# Patient Record
Sex: MALE | Race: BLACK OR AFRICAN AMERICAN | Employment: OTHER | URBAN - METROPOLITAN AREA
[De-identification: names, ages, dates, MRNs, and addresses within clinical notes are randomized per-mention and may not be internally consistent; named-entity substitution may affect disease eponyms.]

---

## 2017-06-02 ENCOUNTER — OFFICE VISIT (OUTPATIENT)
Dept: PAIN MANAGEMENT | Age: 37
End: 2017-06-02

## 2017-06-02 DIAGNOSIS — Z71.89 COUNSELING AND COORDINATION OF CARE: Primary | ICD-10-CM

## 2017-06-07 ENCOUNTER — OFFICE VISIT (OUTPATIENT)
Dept: PAIN MANAGEMENT | Age: 37
End: 2017-06-07

## 2017-06-07 VITALS
WEIGHT: 171 LBS | BODY MASS INDEX: 23.94 KG/M2 | HEART RATE: 52 BPM | HEIGHT: 71 IN | RESPIRATION RATE: 19 BRPM | SYSTOLIC BLOOD PRESSURE: 153 MMHG | DIASTOLIC BLOOD PRESSURE: 96 MMHG

## 2017-06-07 DIAGNOSIS — Z79.899 ENCOUNTER FOR LONG-TERM (CURRENT) USE OF MEDICATIONS: ICD-10-CM

## 2017-06-07 DIAGNOSIS — Z86.59 HISTORY OF DEPRESSION: ICD-10-CM

## 2017-06-07 DIAGNOSIS — M54.2 NECK PAIN: ICD-10-CM

## 2017-06-07 DIAGNOSIS — M47.816 DEGENERATIVE JOINT DISEASE OF CERVICAL AND LUMBAR SPINE: ICD-10-CM

## 2017-06-07 DIAGNOSIS — F43.10 PTSD (POST-TRAUMATIC STRESS DISORDER): ICD-10-CM

## 2017-06-07 DIAGNOSIS — M50.30 DDD (DEGENERATIVE DISC DISEASE), CERVICAL: ICD-10-CM

## 2017-06-07 DIAGNOSIS — M54.41 CHRONIC MIDLINE LOW BACK PAIN WITH RIGHT-SIDED SCIATICA: Primary | ICD-10-CM

## 2017-06-07 DIAGNOSIS — M47.812 DEGENERATIVE JOINT DISEASE OF CERVICAL AND LUMBAR SPINE: ICD-10-CM

## 2017-06-07 DIAGNOSIS — G89.29 CHRONIC MIDLINE LOW BACK PAIN WITH RIGHT-SIDED SCIATICA: Primary | ICD-10-CM

## 2017-06-07 DIAGNOSIS — G89.4 CHRONIC PAIN SYNDROME: ICD-10-CM

## 2017-06-07 DIAGNOSIS — M51.36 DEGENERATIVE DISC DISEASE, LUMBAR: ICD-10-CM

## 2017-06-07 LAB
ALCOHOL UR POC: NORMAL
AMPHETAMINES UR POC: NEGATIVE
BARBITURATES UR POC: NEGATIVE
BENZODIAZEPINES UR POC: NEGATIVE
BUPRENORPHINE UR POC: NORMAL
CANNABINOIDS UR POC: NEGATIVE
CARISOPRODOL UR POC: NORMAL
COCAINE UR POC: NEGATIVE
FENTANYL UR POC: NORMAL
MDMA/ECSTASY UR POC: NEGATIVE
METHADONE UR POC: NEGATIVE
METHAMPHETAMINE UR POC: NEGATIVE
METHYLPHENIDATE UR POC: NEGATIVE
OPIATES UR POC: NEGATIVE
OXYCODONE UR POC: NEGATIVE
PHENCYCLIDINE UR POC: NEGATIVE
PROPOXYPHENE UR POC: NORMAL
TRAMADOL UR POC: NORMAL
TRICYCLICS UR POC: NEGATIVE

## 2017-06-07 RX ORDER — FLUOXETINE 20 MG/1
20 TABLET ORAL DAILY
COMMUNITY

## 2017-06-07 RX ORDER — TRAMADOL HYDROCHLORIDE 50 MG/1
50 TABLET ORAL
COMMUNITY

## 2017-06-07 RX ORDER — CETIRIZINE HCL 10 MG
TABLET ORAL
COMMUNITY

## 2017-06-07 RX ORDER — DIAZEPAM 2 MG/1
2 TABLET ORAL
Qty: 90 TAB | Refills: 0 | Status: SHIPPED | OUTPATIENT
Start: 2017-06-07 | End: 2017-07-10 | Stop reason: SDUPTHER

## 2017-06-07 RX ORDER — ZOLPIDEM TARTRATE 10 MG/1
12.5 TABLET ORAL
COMMUNITY

## 2017-06-07 RX ORDER — GABAPENTIN 400 MG/1
400 CAPSULE ORAL 3 TIMES DAILY
Status: ON HOLD | COMMUNITY
End: 2017-11-01

## 2017-06-07 NOTE — MR AVS SNAPSHOT
Visit Information Date & Time Provider Department Dept. Phone Encounter #  
 6/7/2017 12:30 PM Breanna Turcios MD [facility]  Follow-up Instructions Return in about 5 weeks (around 7/12/2017). Upcoming Health Maintenance Date Due DTaP/Tdap/Td series (1 - Tdap) 9/28/2003 INFLUENZA AGE 9 TO ADULT 8/1/2017 Allergies as of 6/7/2017  Review Complete On: 6/7/2017 By: Breanna Turcios MD  
 No Known Allergies Current Immunizations  Never Reviewed No immunizations on file. Not reviewed this visit You Were Diagnosed With   
  
 Codes Comments Encounter for long-term (current) use of medications    -  Primary ICD-10-CM: C53.227 ICD-9-CM: V58.69 Vitals BP Pulse Resp Height(growth percentile) Weight(growth percentile) BMI  
 (!) 153/96 (!) 52 19 5' 11\" (1.803 m) 171 lb (77.6 kg) 23.85 kg/m2 Smoking Status Never Smoker Vitals History BMI and BSA Data Body Mass Index Body Surface Area  
 23.85 kg/m 2 1.97 m 2 Your Updated Medication List  
  
   
This list is accurate as of: 6/7/17  1:30 PM.  Always use your most recent med list.  
  
  
  
  
 cetirizine 10 mg tablet Commonly known as:  ZYRTEC Take  by mouth. FLUoxetine 20 mg tablet Commonly known as:  PROzac Take 20 mg by mouth daily. gabapentin 400 mg capsule Commonly known as:  NEURONTIN Take 400 mg by mouth three (3) times daily. traMADol 50 mg tablet Commonly known as:  ULTRAM  
Take 50 mg by mouth every eight (8) hours as needed for Pain.  
  
 zolpidem 10 mg tablet Commonly known as:  AMBIEN Take  by mouth nightly as needed for Sleep. We Performed the Following AMB POC DRUG SCREEN () [ [facility]] DRUG SCREEN [[id number] Custom] Follow-up Instructions Return in about 5 weeks (around 7/12/2017). Introducing [facility]! Gurmeet Keep introduces [employer] patient portal. Now you can access parts of your medical record, email your doctor's office, and request medication refills online. 1. In your internet browser, go to [email or web address] 2. Click on the First Time User? Click Here link in the Sign In box. You will see the New Member Sign Up page. 3. Enter your [employer] Access Code exactly as it appears below. You will not need to use this code after youve completed the sign-up process. If you do not sign up before the expiration date, you must request a new code. · [employer] Access Code: Oleg Barrier Expires: 9/5/2017 12:02 PM 
 
4. Enter the last four digits of your Social Security Number (xxxx) and Date of Birth (mm/dd/yyyy) as indicated and click Submit. You will be taken to the next sign-up page. 5. Create a [employer] ID. This will be your [employer] login ID and cannot be changed, so think of one that is secure and easy to remember. 6. Create a [employer] password. You can change your password at any time. 7. Enter your Password Reset Question and Answer. This can be used at a later time if you forget your password. 8. Enter your e-mail address. You will receive e-mail notification when new information is available in [address] 9. Click Sign Up. You can now view and download portions of your medical record. 10. Click the Download Summary menu link to download a portable copy of your medical information. If you have questions, please visit the Frequently Asked Questions section of the [employer] website. Remember, [employer] is NOT to be used for urgent needs. For medical emergencies, dial 911. Now available from your iPhone and Android! Please provide this summary of care documentation to your next provider. If you have any questions after today's visit, please call [phone].

## 2017-06-07 NOTE — PROGRESS NOTES
HISTORY OF PRESENT ILLNESS  Marcel Martinez is a 31 y.o. male. HPI Comments: New patient  Referred for pain management by medical [facility]  Visit survey has been reviewed  On the pain diagram the patient indicates low back pain and neck pain with symptoms in arms and legs  The least amount of pain 8 out of 10  Greatest pain 10 out of 10  Average pain 8 out of 10  I reviewed medications listed on the visit survey which includes gabapentin 400 mg 3 times a day, Prozac 20 mg, tramadol 50 mg, Ambien  I have also reviewed medications in the medical records. I attempted to review medications through prescription monitoring program but patient in medical care is  based. The patient has tried oral nonsteroidal anti-inflammatory drugs and may use these as directed by primary care physician. I have reviewed progress notes sent to our clinic from the medical clinic/[facility] [place]. There have been prescriptions for tizanidine 4 mg, ibuprofen 800 mg, there have been prescriptions for Valium  -He is active [employer]. He is working with physical therapy  He was mentioning feelings of numbness to the physical therapist and he has been scheduled to see a neurologist, in several weeks. He has not seen other specialist about his neck and back pain until today. Chief complaint neck pain and low back pain    Neck pain, midline, deep, achy and sharp. Certain neck movements increase neck pain and arm pain. Intermittent symptoms into both arms, all the way to his hands/fingers. Neck pain is present every day also present during the night. Almost constant. Neck pain started December 2016. He feels that neck and low back pain started after a 12 hour work shift. Low back pain, also started in December, 2016. Low back pain is slightly to the right. Present during day and night. Low back pain increases with extended sitting, standing, walking.   Intermittent symptoms into the right thigh, sometimes past the knee. Low back pain, deep, achy, sharp. He has tried physical therapy and massage. He has not had electromyography testing performed. No history of spine injections or surgery. His Prozac is to help with depression and posttraumatic stress disorder. He is seeing the mental health clinic  He was educated that there might be an interaction with tramadol and Prozac. He did try tramadol, holding on use of Prozac temporarily. He does not feel tramadol or gabapentin have been helpful. He reports no benefit with Flexeril, Motrin, Zanaflex. He reports that Valium helped with muscle cramps. He is no longer using the gabapentin, Zanaflex or tramadol. He is still using Prozac and Ambien. -CT of cervical spine without contrast, 2/11/17, radiologist impression: Mild degenerative disease C6-7, otherwise unremarkable    MRI cervical spine, May 2017, radiologist impression multilevel mild degenerative disc and joint changes. MRI of the lumbar spine, May 2017, radiologist impression: Mild facet degenerative changes L4-S1 and mild degenerative disc disease L3-4. X-ray of lumbar spine-no acute bony abnormality    The patient  should keep track of total Tylenol intake and make sure liver function tests have been checked with primary care physician.    -opioid risk tool has been reviewed, and will be scanned. Total score--2,   history depression, history posttraumatic stress disorder    -The available medical record/information has been reviewed including notes from the referring physician's office.     -New patient survey reviewed and will be scanned. Please see this form for more information concerning PMH,FH,SH, and ROS. The majority of today's visit was spent counseling and coordinating care. Total visit time was greater than 60 minutes. - Preliminary urine screen reviewed. - Additional time was spent reviewing medical records,interpreting data and educating the patient. -Discussing Dxes,condition and treatment options,possible side effects/risks/complications. Review of Systems   Constitutional: Positive for chills, malaise/fatigue and weight loss. HENT: Negative for hearing loss. Eyes: Negative for pain and discharge. Respiratory: Negative for cough and shortness of breath. Cardiovascular: Negative for claudication and leg swelling. Gastrointestinal: Positive for diarrhea and heartburn. Genitourinary: Negative for dysuria and urgency. Musculoskeletal: Positive for back pain and joint pain. Skin: Negative for itching and rash. Neurological: Positive for headaches. Negative for dizziness and seizures. Endo/Heme/Allergies: Positive for environmental allergies. Does not bruise/bleed easily. Psychiatric/Behavioral: Negative for substance abuse and suicidal ideas. The patient has insomnia. Physical Exam   Constitutional: He appears well-developed and well-nourished. He is cooperative. He does not have a sickly appearance. HENT:   Head: Normocephalic and atraumatic. Right Ear: External ear normal. No drainage. Left Ear: External ear normal. No drainage. Nose: Nose normal.   Mouth/Throat: No oropharyngeal exudate. Eyes: Lids are normal. Right eye exhibits no discharge. Left eye exhibits no discharge. Right conjunctiva has no hemorrhage. Left conjunctiva has no hemorrhage. Pupils are equal.   Neck: Neck supple. No tracheal deviation present. No thyroid mass present. Cardiovascular: Normal rate and regular rhythm. No murmur heard. Pulmonary/Chest: Effort normal and breath sounds normal. No respiratory distress. Musculoskeletal:        Cervical back: He exhibits decreased range of motion, tenderness and spasm. Lumbar back: He exhibits decreased range of motion, tenderness and spasm. Neurological: He is alert. He has normal reflexes. No cranial nerve deficit.    Antalgic gait  Grossly normal strength, deep tendon reflexes, light touch sensation upper extremities  Tenderness to the right hip and tenderness to both sacroiliac joints  Grossly normal strength lower extremities except possible weakness right hip and right knee movements, lack of full effort secondary to complaints of pain  Decreased deep tendon reflexes lower extremities  Negative seated straight leg raise test bilaterally  Grossly normal light touch sensation lower extremities   Skin: Skin is intact. No abrasion and no rash noted. He is not diaphoretic. No cyanosis. Psychiatric: His speech is normal and behavior is normal. Thought content normal. His mood appears not anxious. His affect is not angry. Cognition and memory are normal. He does not express inappropriate judgment. He does not exhibit a depressed mood. Nursing note and vitals reviewed. ASSESSMENT and PLAN  Encounter Diagnoses   Name Primary?  Chronic midline low back pain with right-sided sciatica Yes    Chronic pain syndrome     Encounter for long-term (current) use of medications     PTSD (post-traumatic stress disorder)     Neck pain     History of depression     DDD (degenerative disc disease), cervical     Degenerative disc disease, lumbar     Degenerative joint disease of cervical and lumbar spine     follow-up, about 5 weeks  The patient has indicated that all the pain medicine he has tried and other medications have not been helpful except for Valium. I will prescribe Valium 2 mg 3 times a day as needed to help with muscle spasm, tightness and pain. He may use oral nonsteroidal anti-inflammatory drugs as directed by primary care physician  My primary recommendation today is that he work with a chiropractor. I gave him the name of several clinics I would recommend. I believe he should continue close follow-up with his mental health clinic this should include assistance with pain management from their standpoint.   Consider Cymbalta but he is already on Prozac, I would want to know that his psychiatrist approves of Cymbalta if this were to be started. I do believe he should remain physically active including gentle exercise in the pool. He would also benefit from yoga, massage, alternative treatments. Overall the radiology reports of his neck and his low back are not overly impressive. Some mild degenerative changes. At this time I am not recommending spine injections but if there is no improvement in the future this could certainly be considered. -He will be seeing a neurologist in about 2 weeks. I have asked for him to discuss the possibility of electromyography testing with the neurologist.  I will certainly be interested in the impression from the neurologist.  At this time my initial impression is there might be some symptom magnification. He seemed to pull away quickly even with light palpation to sacroiliac joints in his right hip. He was quite hesitant when testing strength of his legs especially the right leg. There seemed to be the possibility of exaggeration of his antalgic gait. If there is an underlying diagnosis made by the neurologist to help explain my impression that I certainly am willing to change my opinion. It should also be noted that there was a progress note from his medical clinic, February of this year that included the following statement- \"today, he is reported to have limped in to exam room reporting this as associated with his neck pain. He was observed to ambulate without problem during my exam.  He also asked for refill of Valium as the only thing that helps his neck and upper back discomfort. \"  I believe this statement indicates that they were also feeling there might be some symptom magnification.  -At this time, I am not recommending use of opioid pain medicine to treat his pain complaints.       Pain is a complex sensory and emotional experience intimately related to the concept of suffering and the life experiences and psychological makeup of the individual. There is a host of psychosocial issues,including depression,anxiety,fear,altered social roles,and loss of activities which have a strong scientific basis for contributing to the chronic pain state. The effective treatment of chronic pain involves understanding the individual with pain. Issues of fear avoidance,catastrophizing,belief systems about exercise and injury,sleep disturbance,family dynamics,and other factors may play a role in the patient's experience of pain.

## 2017-07-10 ENCOUNTER — OFFICE VISIT (OUTPATIENT)
Dept: PAIN MANAGEMENT | Age: 37
End: 2017-07-10

## 2017-07-10 VITALS
WEIGHT: 179 LBS | HEIGHT: 71 IN | HEART RATE: 63 BPM | BODY MASS INDEX: 25.06 KG/M2 | RESPIRATION RATE: 16 BRPM | DIASTOLIC BLOOD PRESSURE: 87 MMHG | SYSTOLIC BLOOD PRESSURE: 146 MMHG

## 2017-07-10 DIAGNOSIS — M54.2 NECK PAIN: ICD-10-CM

## 2017-07-10 DIAGNOSIS — M47.816 DEGENERATIVE JOINT DISEASE OF CERVICAL AND LUMBAR SPINE: ICD-10-CM

## 2017-07-10 DIAGNOSIS — G89.29 CHRONIC MIDLINE LOW BACK PAIN WITH RIGHT-SIDED SCIATICA: Primary | ICD-10-CM

## 2017-07-10 DIAGNOSIS — F43.10 PTSD (POST-TRAUMATIC STRESS DISORDER): ICD-10-CM

## 2017-07-10 DIAGNOSIS — M54.41 CHRONIC MIDLINE LOW BACK PAIN WITH RIGHT-SIDED SCIATICA: Primary | ICD-10-CM

## 2017-07-10 DIAGNOSIS — M50.30 DDD (DEGENERATIVE DISC DISEASE), CERVICAL: ICD-10-CM

## 2017-07-10 DIAGNOSIS — M51.36 DEGENERATIVE DISC DISEASE, LUMBAR: ICD-10-CM

## 2017-07-10 DIAGNOSIS — Z86.59 HISTORY OF DEPRESSION: ICD-10-CM

## 2017-07-10 DIAGNOSIS — G89.4 CHRONIC PAIN SYNDROME: ICD-10-CM

## 2017-07-10 DIAGNOSIS — M47.812 DEGENERATIVE JOINT DISEASE OF CERVICAL AND LUMBAR SPINE: ICD-10-CM

## 2017-07-10 RX ORDER — DIAZEPAM 2 MG/1
2 TABLET ORAL
Qty: 90 TAB | Refills: 1 | Status: SHIPPED | OUTPATIENT
Start: 2017-07-10 | End: 2017-09-06 | Stop reason: SDUPTHER

## 2017-07-10 RX ORDER — CELECOXIB 200 MG/1
CAPSULE ORAL 2 TIMES DAILY
COMMUNITY
End: 2018-08-20 | Stop reason: ALTCHOICE

## 2017-07-10 RX ORDER — LOSARTAN POTASSIUM AND HYDROCHLOROTHIAZIDE 12.5; 5 MG/1; MG/1
1 TABLET ORAL DAILY
COMMUNITY

## 2017-07-10 NOTE — MR AVS SNAPSHOT
Visit Information Date & Time Provider Department Dept. Phone Encounter #  
 7/10/2017  2:30 PM Shantal Dubon MD [address] [place] [address] Pain Management [phone] Follow-up Instructions Return in about 2 months (around 9/10/2017). Your Appointments 7/10/2017  2:30 PM  
Follow Up with Shantal Dubon MD  
[address] for Pain Management [facility]) Appt Note: return in 7 weeks [address] [place] [address]  
[phone] [facility] [id number] Upcoming Health Maintenance Date Due DTaP/Tdap/Td series (1 - Tdap) 9/28/2003 INFLUENZA AGE 9 TO ADULT 8/1/2017 Allergies as of 7/10/2017  Review Complete On: 7/10/2017 By: Shantal Dubon MD  
 No Known Allergies Current Immunizations  Never Reviewed No immunizations on file. Not reviewed this visit You Were Diagnosed With   
  
 Codes Comments Chronic midline low back pain with right-sided sciatica    -  Primary ICD-10-CM: M54.41, G89.29 ICD-9-CM: 724.2, 724.3, 338.29 Chronic pain syndrome     ICD-10-CM: G89.4 ICD-9-CM: 338.4 PTSD (post-traumatic stress disorder)     ICD-10-CM: F43.10 ICD-9-CM: 309.81 Neck pain     ICD-10-CM: M54.2 ICD-9-CM: 723.1 History of depression     ICD-10-CM: Z86.59 
ICD-9-CM: V11.8 DDD (degenerative disc disease), cervical     ICD-10-CM: M50.30 ICD-9-CM: 722.4 Degenerative disc disease, lumbar     ICD-10-CM: M51.36 
ICD-9-CM: 722.52 Degenerative joint disease of cervical and lumbar spine     ICD-10-CM: M47.812, M47.816 ICD-9-CM: 721.0, 721.3 Vitals BP Pulse Resp Height(growth percentile) Weight(growth percentile) BMI  
 146/87 63 16 5' 11\" (1.803 m) 179 lb (81.2 kg) 24.97 kg/m2 Smoking Status Never Smoker Vitals History BMI and BSA Data Body Mass Index Body Surface Area  24.97 kg/m 2 2.02 m 2  
  
  
 Your Updated Medication List  
  
   
This list is accurate as of: 7/10/17  2:19 PM.  Always use your most recent med list.  
  
  
  
  
 CeleBREX 200 mg capsule Generic drug:  celecoxib Take  by mouth two (2) times a day. cetirizine 10 mg tablet Commonly known as:  ZYRTEC Take  by mouth. diazePAM 2 mg tablet Commonly known as:  VALIUM Take 1 Tab by mouth three (3) times daily as needed for Anxiety. Max Daily Amount: 6 mg. FLUoxetine 20 mg tablet Commonly known as:  PROzac Take 20 mg by mouth daily. gabapentin 400 mg capsule Commonly known as:  NEURONTIN Take 400 mg by mouth three (3) times daily. losartan-hydroCHLOROthiazide 50-12.5 mg per tablet Commonly known as:  HYZAAR Take 1 Tab by mouth daily. traMADol 50 mg tablet Commonly known as:  ULTRAM  
Take 50 mg by mouth every eight (8) hours as needed for Pain.  
  
 zolpidem 10 mg tablet Commonly known as:  AMBIEN Take  by mouth nightly as needed for Sleep. Prescriptions Printed Refills  
 diazePAM (VALIUM) 2 mg tablet 1 Sig: Take 1 Tab by mouth three (3) times daily as needed for Anxiety. Max Daily Amount: 6 mg. Class: Print Route: Oral  
  
Follow-up Instructions Return in about 2 months (around 9/10/2017). Introducing \[facility]\"" & HEALTH SERVICES! Alivia Sibley introduces [employer] patient portal. Now you can access parts of your medical record, email your doctor's office, and request medication refills online. 1. In your internet browser, go to [email or web address] 2. Click on the First Time User? Click Here link in the Sign In box. You will see the New Member Sign Up page. 3. Enter your [employer] Access Code exactly as it appears below. You will not need to use this code after youve completed the sign-up process. If you do not sign up before the expiration date, you must request a new code.  
 
· [employer] Access Code: Di Moody 
 Expires: 9/5/2017 12:02 PM 
 
4. Enter the last four digits of your Social Security Number (xxxx) and Date of Birth (mm/dd/yyyy) as indicated and click Submit. You will be taken to the next sign-up page. 5. Create a [employer] ID. This will be your [employer] login ID and cannot be changed, so think of one that is secure and easy to remember. 6. Create a [employer] password. You can change your password at any time. 7. Enter your Password Reset Question and Answer. This can be used at a later time if you forget your password. 8. Enter your e-mail address. You will receive e-mail notification when new information is available in [address] 9. Click Sign Up. You can now view and download portions of your medical record. 10. Click the Download Summary menu link to download a portable copy of your medical information. If you have questions, please visit the Frequently Asked Questions section of the [employer] website. Remember, [employer] is NOT to be used for urgent needs. For medical emergencies, dial 911. Now available from your iPhone and Android! Please provide this summary of care documentation to your next provider. If you have any questions after today's visit, please call [phone].

## 2017-07-10 NOTE — PROGRESS NOTES
HISTORY OF PRESENT ILLNESS  Alexandria Rivera is a 31 y.o. male. HPI Comments: Meds help with pain control and quality of life. No new side effects reported today. Visit survey reviewed and will be scanned.  reviewed. -Chief complaint low back pain, neck pain  Chronic pain syndrome  Using Valium 2 mg 3 times a day as needed  He reports that his primary care physician recently started him on Celebrex and it is helpful with pain in some parts of his body but not others  He remains on Prozac from his mental health clinic  He was previously scheduled for electromyography testing. This had been requested by a different clinic. I had encouraged patient to work with a chiropractor. He has rescheduled electromyography testing and has not yet started chiropractic treatment. He reports he has been busy with [employer] and also his wife is having some health issues. I have encouraged him to work with a chiropractor in the follow-through and have electromyography testing performed. Measuring clinical outcomes of chronic pain patients. This was reviewed today. The survey will be scanned. Please see the survey for details. Total score-7    Back Pain    Associated symptoms include weight loss and headaches. Pertinent negatives include no dysuria. Hip Pain    Associated symptoms include back pain. Pertinent negatives include no itching. Review of Systems   Constitutional: Positive for chills, malaise/fatigue and weight loss. HENT: Negative for hearing loss. Eyes: Negative for pain and discharge. Respiratory: Negative for cough and shortness of breath. Cardiovascular: Negative for claudication and leg swelling. Gastrointestinal: Positive for diarrhea and heartburn. Genitourinary: Negative for dysuria and urgency. Musculoskeletal: Positive for back pain and joint pain. Skin: Negative for itching and rash. Neurological: Positive for headaches. Negative for dizziness and seizures. Endo/Heme/Allergies: Positive for environmental allergies. Does not bruise/bleed easily. Psychiatric/Behavioral: Negative for substance abuse and suicidal ideas. The patient has insomnia. Physical Exam   Constitutional: He appears well-developed and well-nourished. He is cooperative. He does not have a sickly appearance. HENT:   Head: Normocephalic and atraumatic. Right Ear: External ear normal. No drainage. Left Ear: External ear normal. No drainage. Nose: Nose normal.   Eyes: Lids are normal. Right eye exhibits no discharge. Left eye exhibits no discharge. Right conjunctiva has no hemorrhage. Left conjunctiva has no hemorrhage. Neck: Neck supple. No tracheal deviation present. No thyroid mass present. Pulmonary/Chest: Effort normal. No respiratory distress. Neurological: He is alert. No cranial nerve deficit. Skin: Skin is intact. No rash noted. Psychiatric: His speech is normal. His affect is not angry. He does not express inappropriate judgment. Nursing note and vitals reviewed. ASSESSMENT and PLAN  Encounter Diagnoses   Name Primary?  Chronic midline low back pain with right-sided sciatica Yes    Chronic pain syndrome     PTSD (post-traumatic stress disorder)     Neck pain     History of depression     DDD (degenerative disc disease), cervical     Degenerative disc disease, lumbar     Degenerative joint disease of cervical and lumbar spine    No indicators for recent medication misuse.  reviewed. Pain Meds and Quality Of Life have been reviewed. Nonpharmacologic therapy and non-opioid pharmacologic therapy were considered. If opioid therapy is prescribed, this is only if the expected benefits are anticipated to outweigh risks. Possible changes to treatment plan considered. Support/education given as needed. Today-medications are as listed. No significant changes to medications. Follow up -- 2 months.

## 2017-07-10 NOTE — PROGRESS NOTES
Nursing Notes    Patient presents to the office today in follow-up. Mr. Maria Antonia Coyle has a reminder for a \"due or due soon\" health maintenance. I have asked that he contact his primary care provider for follow-up on this health maintenance. Comments: did not bring medications. States he forgot.  reviewed and noted last filled valium 6/10/17    POC UDS was not performed in office today    Any new labs or imaging since last appointment? NO    Have you been to an emergency room (ER) or urgent care clinic since your last visit? NO            Have you been hospitalized since your last visit? no   If yes, where, when, and reason for visit? Have you seen or consulted any other health care providers outside of the [facility]  since your last visit? YES     If yes, where, when, and reason for visit?    Janet Maciel

## 2017-08-29 ENCOUNTER — OFFICE VISIT (OUTPATIENT)
Dept: PAIN MANAGEMENT | Age: 37
End: 2017-08-29

## 2017-08-29 VITALS
SYSTOLIC BLOOD PRESSURE: 133 MMHG | BODY MASS INDEX: 25.2 KG/M2 | HEART RATE: 64 BPM | TEMPERATURE: 98.7 F | RESPIRATION RATE: 10 BRPM | HEIGHT: 71 IN | WEIGHT: 180 LBS | DIASTOLIC BLOOD PRESSURE: 82 MMHG

## 2017-08-29 DIAGNOSIS — M51.36 LUMBAR DEGENERATIVE DISC DISEASE: ICD-10-CM

## 2017-08-29 DIAGNOSIS — M47.816 DEGENERATIVE JOINT DISEASE OF CERVICAL AND LUMBAR SPINE: ICD-10-CM

## 2017-08-29 DIAGNOSIS — M47.812 DEGENERATIVE JOINT DISEASE OF CERVICAL AND LUMBAR SPINE: ICD-10-CM

## 2017-08-29 DIAGNOSIS — G89.4 CHRONIC PAIN SYNDROME: ICD-10-CM

## 2017-08-29 DIAGNOSIS — M47.816 SPONDYLOSIS OF LUMBAR REGION WITHOUT MYELOPATHY OR RADICULOPATHY: ICD-10-CM

## 2017-08-29 DIAGNOSIS — M54.16 LUMBAR RADICULITIS: Primary | ICD-10-CM

## 2017-08-29 NOTE — MR AVS SNAPSHOT
Visit Information Date & Time Provider Department Dept. Phone Encounter #  
 8/29/2017  4:30 PM Ximena Chavez MD [address] [facility] [phone] [id number] Your Appointments 9/11/2017  8:15 AM  
Follow Up with Cynthia Joiner MD  
[address] [facility] [facility]) Appt Note: 2 MONTH F/U  
 [address] [facility] [address]  
[phone] [facility] [id number] Upcoming Health Maintenance Date Due DTaP/Tdap/Td series (1 - Tdap) 9/28/2003 INFLUENZA AGE 9 TO ADULT 8/1/2017 Allergies as of 8/29/2017  Review Complete On: 8/29/2017 By: Ximena Chavez MD  
 No Known Allergies Current Immunizations  Never Reviewed No immunizations on file. Not reviewed this visit You Were Diagnosed With   
  
 Codes Comments Lumbar radiculitis    -  Primary ICD-10-CM: M54.16 
ICD-9-CM: 724.4 Spondylosis of lumbar region without myelopathy or radiculopathy     ICD-10-CM: M47.816 ICD-9-CM: 721.3 Lumbar degenerative disc disease     ICD-10-CM: M51.36 
ICD-9-CM: 722.52 Chronic pain syndrome     ICD-10-CM: G89.4 ICD-9-CM: 338.4 Degenerative joint disease of cervical and lumbar spine     ICD-10-CM: M47.812, M47.816 ICD-9-CM: 721.0, 721.3 Vitals BP Pulse Temp Resp Height(growth percentile) Weight(growth percentile) 133/82 64 98.7 °F (37.1 °C) 10 5' 11\" (1.803 m) 180 lb (81.6 kg) BMI Smoking Status 25.1 kg/m2 Never Smoker BMI and BSA Data Body Mass Index Body Surface Area  
 25.1 kg/m 2 2.02 m 2 Your Updated Medication List  
  
   
This list is accurate as of: 8/29/17  6:09 PM.  Always use your most recent med list.  
  
  
  
  
 CeleBREX 200 mg capsule Generic drug:  celecoxib Take  by mouth two (2) times a day. cetirizine 10 mg tablet Commonly known as:  ZYRTEC Take  by mouth. diazePAM 2 mg tablet Commonly known as:  VALIUM Take 1 Tab by mouth three (3) times daily as needed for Anxiety. Max Daily Amount: 6 mg. FLUoxetine 20 mg tablet Commonly known as:  PROzac Take 20 mg by mouth daily. gabapentin 400 mg capsule Commonly known as:  NEURONTIN Take 400 mg by mouth three (3) times daily. losartan-hydroCHLOROthiazide 50-12.5 mg per tablet Commonly known as:  HYZAAR Take 1 Tab by mouth daily. traMADol 50 mg tablet Commonly known as:  ULTRAM  
Take 50 mg by mouth every eight (8) hours as needed for Pain.  
  
 zolpidem 10 mg tablet Commonly known as:  AMBIEN Take  by mouth nightly as needed for Sleep. To-Do List   
 09/29/2017 Imaging:  MRI LUMB SPINE WO CONT Introducing [facility]! [facility] introduces [employer] patient portal. Now you can access parts of your medical record, email your doctor's office, and request medication refills online. 1. In your internet browser, go to [email or web address] 2. Click on the First Time User? Click Here link in the Sign In box. You will see the New Member Sign Up page. 3. Enter your [employer] Access Code exactly as it appears below. You will not need to use this code after youve completed the sign-up process. If you do not sign up before the expiration date, you must request a new code. · [employer] Access Code: Stella Gamino Expires: 9/5/2017 12:02 PM 
 
4. Enter the last four digits of your Social Security Number (xxxx) and Date of Birth (mm/dd/yyyy) as indicated and click Submit. You will be taken to the next sign-up page. 5. Create a [employer] ID. This will be your [employer] login ID and cannot be changed, so think of one that is secure and easy to remember. 6. Create a [employer] password. You can change your password at any time. 7. Enter your Password Reset Question and Answer. This can be used at a later time if you forget your password. 8. Enter your e-mail address. You will receive e-mail notification when new information is available in [address] 9. Click Sign Up. You can now view and download portions of your medical record. 10. Click the Download Summary menu link to download a portable copy of your medical information. If you have questions, please visit the Frequently Asked Questions section of the [employer] website. Remember, [employer] is NOT to be used for urgent needs. For medical emergencies, dial 911. Now available from your iPhone and Android! Please provide this summary of care documentation to your next provider. If you have any questions after today's visit, please call [phone].

## 2017-08-29 NOTE — PROGRESS NOTES
[address] [facility]  Interventional Pain Management Consultation History & Physical    PATIENT NAME:  Cari Kohler     YOB: 1980    DATE OF SERVICE:   8/29/2017      CHIEF COMPLAINT:  Back Pain (lower into hips)      REASON FOR VISIT:   Cari Kohler presents to the pain clinic today for initial evaluation and to consider interventional pain management options as indicated for the type and location of the pain the patient is presenting with. HISTORY OF PRESENT ILLNESS: This is an initial evaluation and consideration for interventional procedures. Patient is referred to us through the [employer] system. He is referred for both his neck pain as well as low back pain. He has diagnoses at today's visit including cervicalgia, M54.2. He also has another diagnosis M54.5, low back pain. At today's evaluation, patient requests his low back pain be addressed first, we can then address his neck pain. Patient states that he has had chronic low back pain since October 2016. 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 This caused him to have onset of dramatic low back pain. He currently endorses aching burning pain across his low back. Pain refers to either buttocks area. Patient endorses radiating leg pain. This pain apparently is much improved with the patient's neuropathic medication regimen, currently Lyrica. Pain is made worse with lumbar facet loading maneuvers. Patient endorses lumbar axial loading maneuvers that increase low back pain, including prolonged sitting, standing, and walking. These maneuvers increase the patient's low back pain symptoms. He is tried Celebrex, gabapentin, Lyrica which has helped his upper and low back pain. He has not had previous interventional pain procedures. He denies current use of blood thinners.   He is tried physical therapy, this made his low back pain worse. Physical therapy was done at [facility] he had about 4 months worth of physical therapy. By review of available medical record, progress note dated July 10, 2017 by Dr. Chandu Fabian is reviewed. Patient has chronic low back and neck pain. Chronic pain syndrome. Cervical and lumbar degenerative disc disease. Cervical and lumbar spondylosis. Plain imaging of the lumbar spine is reviewed, this is dated March 15, 2017. No acute osseous abnormality or significant degenerative changes noted. No recent MRI imaging of the lumbar spine is located with today's [employer] notes. Patient also endorses pain at the base of his neck and slightly up from the base of the neck. He endorses bilateral upper extremity radiating pain, numbness and tingling. This pain is increased with turning his head to either side. He states that this pain i.e. the radiating upper extremity pain, numbness and tingling, have improved on his Lyrica regimen. CT of the cervical spine without contrast done 11 February 2017, shows mild degenerative disc disease C6-7. Mild broad-based disc osteophyte complex noted C3-4, C4-5, C6-7. Overall impression multilevel mild degenerative disc and joint changes of the cervical spine. This is by cervical spine MRI done May 20, 2017. Patient has also had MRI of the lumbar spine done May 19, 2017. This shows mild facet degenerative changes from L4-S1 and mild degenerative disc disease L3-4. ASSESSMENT/OPTIONS: as follows. We discussed options. Patient presents with chronic low back pain, with onset October 2016. 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 He has had low back pain, and also neck pain ever since this time. He has endorsed low back and radiating leg pain. He endorses lumbar axial loading maneuvers that increase his low back pain.   His lumbar MRI demonstrates degenerative disc changes L3-4. I believe he would benefit from a series of intralaminar lumbar epidural steroid injections at L3-4 level with IV conscious sedation. We will plan a series of 3 of these injections as noted. I have discussed the risks and benefits, indications, contraindications, and side effects of intended procedure with the patient. I have used skeleton spine model to describe and discuss the procedure with the patient. I have answered all questions relating to the procedure. Patient understands the nature of the procedure and wishes to proceed. Patient has no further questions. We can subsequently address patient's neck and upper extremity symptoms. He has been complaining of radiating pain of both upper extremities along with numbness and tingling increased with looking to either direction. My feeling is that he may have an element of cervical spondylotic changes, cervical degenerative disc disease, cervical radiculitis causing his symptoms. We will discuss this at a later visit. MRI Results (most recent):  No results found for this or any previous visit. PAST MEDICAL HISTORY:   The patient  has a past medical history of Depression; Headache; Insomnia; Meningitis; and PTSD (post-traumatic stress disorder). PAST SURGICAL HISTORY:   The patient  has no past surgical history on file. CURRENT MEDICATIONS:   The patient has a current medication list which includes the following prescription(s): celecoxib, losartan-hydrochlorothiazide, diazepam, fluoxetine, tramadol, zolpidem, cetirizine, and gabapentin. ALLERGIES:   No Known Allergies    FAMILY HISTORY:   The patient family history includes Heart Disease in his father; Hypertension in his father and mother. SOCIAL HISTORY:   The patient  reports that he has never smoked. He does not have any smokeless tobacco history on file. The patient  reports that he does not drink alcohol. He also  reports that he does not use illicit drugs. REVIEW OF SYSTEMS:    The patient denies fever, chills, weight loss (Constitutional), rash, itching (Skin), tinnitus, congestion (HENT), blurred vision, photophobia (Eyes), palpitations, orthopnea (Cardiovascular), hemoptysis, wheezing (Respiratory), nausea, vomiting, diarrhea (Gastrointestinal), dysuria, hematuria, urgency (Genitourinary), bowel or bladder incontinence, loss of consciousness (Neurologic), suicidal or homicidal ideation or hallucinations (Psychiatric). Denies swelling, axillary or groin masses (Lymphatic). PHYSICAL EXAM:  VS:   Visit Vitals    /82    Pulse 64    Temp 98.7 °F (37.1 °C)    Resp 10    Ht 5' 11\" (1.803 m)    Wt 81.6 kg (180 lb)    BMI 25.1 kg/m2     General: Well-developed and well-nourished. Body habitus consistent with recorded height and weight and the calculated BMI. Apparent distress due to neck and low back pain. Head: Normocephalic, atraumatic. Skin: Inspection of the skin reveals no rashes, lesions or infection. CV: Regular rate. No murmurs or rubs noted. No peripheral edema noted. Pulm: Respirations are even and unlabored. Extr: No clubbing, cyanosis, or edema noted. Musculoskeletal:  1. Cervical spine -decreased range of motion all axes . Paraspinous tenderness bilaterally . There is no scoliosis, asymmetry, or musculoskeletal defect. 2. Thoracic spine - Full ROM. No paraspinous tenderness at any level. There is no scoliosis, asymmetry, or musculoskeletal defect. 3. Lumbar spine -decreased range of motion . Paraspinous tenderness throughout the lumbar spine . SI joints are nontender bilaterally. There is no scoliosis, asymmetry, or musculoskeletal defect. 4. Right upper extremity - Full ROM. 5/5 muscle strength in all muscle groups. No pain or tenderness in shoulder, elbow, wrist, or hand. 5. Left upper extremity - Full ROM. 5/5 muscle strength in all muscle groups.   No pain or tenderness in shoulder, elbow, wrist, or hand. 6. Right lower extremity - Full ROM. 5/5 muscle strength in all muscle groups. No pain, tenderness, or swelling in the hip, knee, ankle or foot. 7. Left lower extremity - Full ROM. 5/5 muscle strength in all muscle groups. No pain, tenderness, or swelling in the hip, knee, ankle or foot. Neurological:  1. Mental Status - Alert, awake and oriented. Speech is clear and appropriate. 2. Cranial Nerves - Extraocular muscles intact bilaterally. Cranial nerves II-XII grossly intact bilaterally. 3. Gait - antalgic   4. Reflexes - 2+ and symmetric throughout. 5. Sensation - Intact to light touch and pin prick. 6. Provocative Tests - Spurlings negative bilaterally. Straight leg raise negative bilaterally. Psychological:  1. Mood and affect - Appropriate. 2. Speech - Appropriate. 3. Though content - Appropriate. 4. Judgment - Appropriate. ASSESSMENT:      ICD-10-CM ICD-9-CM    1. Lumbar radiculitis M54.16 724.4    2. Spondylosis of lumbar region without myelopathy or radiculopathy M47.816 721.3 MRI LUMB SPINE WO CONT   3. Lumbar degenerative disc disease M51.36 722. 52 MRI LUMB SPINE WO CONT   4. Chronic pain syndrome G89.4 338.4 MRI LUMB SPINE WO CONT   5. Degenerative joint disease of cervical and lumbar spine M47.812 721.0 MRI LUMB SPINE WO CONT    M47.816 721.3            PLAN:    1.    I have thoroughly discussed the risks and benefits, indications, contraindications, and side effects of any and procedures that were mentioned at today's patient visit. I have used a skeleton model to explain all procedures, as well as to provide added emphasis regarding procedures and as well for patient education purposes. I have answered all questions in great detail, and I have obtained verbal confirmation for all procedures planned with the patient. 3.    I have reviewed in great detail today the patient's MRI and other imaging studies with the patient.  I have explained to the patient their condition using both actual recent and relevant images insofar as I am able to obtain actual images. I have used a skeleton model for added emphasis as well as patient education. 4.    I have advised patient to have a primary care provider continue to care for their health maintenance and general medical conditions. 5,    I have placed appropriate referrals to specialty care providers as I have deemed necessary through today's clinical consultation with the patient. 5.    I have explained to the patient that if any significant side effects, issues, problems, concerns, or perceived complications may have arisen at around the time of the patient's procedures, they should either call the pain management clinic or go to the emergency room immediately for medical provider evaluation. 6.   I have encouraged all patients to call the pain management clinic with any questions or concerns that they may have pertaining to their procedures. DISPOSITION:   The patients condition and plan were discussed at length and all questions were answered. The patient agrees with the plan. A total of 45 minutes was spent with the patient of which over half of the time was spent counseling the patient. Ross Cisneros MD 8/29/2017 4:39 PM    Note: Although these clinic notes were documented by the provider at the time of the exam, they have not been proofed and are subject to transcription variance.

## 2017-09-06 ENCOUNTER — OFFICE VISIT (OUTPATIENT)
Dept: PAIN MANAGEMENT | Age: 37
End: 2017-09-06

## 2017-09-06 VITALS
BODY MASS INDEX: 25.2 KG/M2 | HEIGHT: 71 IN | DIASTOLIC BLOOD PRESSURE: 93 MMHG | WEIGHT: 180 LBS | TEMPERATURE: 97.2 F | SYSTOLIC BLOOD PRESSURE: 129 MMHG | RESPIRATION RATE: 18 BRPM | HEART RATE: 59 BPM

## 2017-09-06 DIAGNOSIS — G89.29 CHRONIC MIDLINE LOW BACK PAIN WITH RIGHT-SIDED SCIATICA: Primary | ICD-10-CM

## 2017-09-06 DIAGNOSIS — M51.36 LUMBAR DEGENERATIVE DISC DISEASE: ICD-10-CM

## 2017-09-06 DIAGNOSIS — M47.816 SPONDYLOSIS OF LUMBAR REGION WITHOUT MYELOPATHY OR RADICULOPATHY: ICD-10-CM

## 2017-09-06 DIAGNOSIS — M50.30 DDD (DEGENERATIVE DISC DISEASE), CERVICAL: ICD-10-CM

## 2017-09-06 DIAGNOSIS — M51.36 DEGENERATIVE DISC DISEASE, LUMBAR: ICD-10-CM

## 2017-09-06 DIAGNOSIS — M47.816 DEGENERATIVE JOINT DISEASE OF CERVICAL AND LUMBAR SPINE: ICD-10-CM

## 2017-09-06 DIAGNOSIS — M47.812 DEGENERATIVE JOINT DISEASE OF CERVICAL AND LUMBAR SPINE: ICD-10-CM

## 2017-09-06 DIAGNOSIS — M54.2 NECK PAIN: ICD-10-CM

## 2017-09-06 DIAGNOSIS — F43.10 PTSD (POST-TRAUMATIC STRESS DISORDER): ICD-10-CM

## 2017-09-06 DIAGNOSIS — M54.41 CHRONIC MIDLINE LOW BACK PAIN WITH RIGHT-SIDED SCIATICA: Primary | ICD-10-CM

## 2017-09-06 DIAGNOSIS — M54.16 LUMBAR RADICULITIS: ICD-10-CM

## 2017-09-06 DIAGNOSIS — Z86.59 HISTORY OF DEPRESSION: ICD-10-CM

## 2017-09-06 DIAGNOSIS — G89.4 CHRONIC PAIN SYNDROME: ICD-10-CM

## 2017-09-06 RX ORDER — PREGABALIN 50 MG/1
50 CAPSULE ORAL 4 TIMES DAILY
COMMUNITY

## 2017-09-06 RX ORDER — DIAZEPAM 2 MG/1
2 TABLET ORAL
Qty: 90 TAB | Refills: 2 | Status: SHIPPED | OUTPATIENT
Start: 2017-09-06 | End: 2018-08-20

## 2017-09-06 RX ORDER — DIAZEPAM 2 MG/1
2 TABLET ORAL
Qty: 90 TAB | Refills: 1 | Status: SHIPPED | OUTPATIENT
Start: 2017-09-06 | End: 2017-09-06 | Stop reason: SDUPTHER

## 2017-09-06 NOTE — PROGRESS NOTES
HISTORY OF PRESENT ILLNESS  Kenyatta Rome is a 31 y.o. male. HPI Comments: Meds help with pain control and quality of life. No new side effects reported today. Visit survey reviewed and will be scanned.  reviewed. Recent average level of pain(out of 10)-8  Chief complaint low back pain  Chronic pain syndrome  Using Valium 2 mg 3 times a day as needed, this is prescribed by our clinic. He does not use this every day. He also uses Zanaflex. He uses the Zanaflex on days different than the Valium. I have encouraged the patient to determine which she feels is most effective and stick with that particular medicine. The Zanaflex, Prozac, tramadol, Lyrica, Celebrex, Ambien are prescribed by other clinics. I believe they are primarily from his general medical clinic. Most of this care is based through [email or web address]. He reports he did see a neurologist.  He states electromyography testing did have some positive findings. Performed by the neurologist.  Unfortunately I do not have the formal results. The neurologist then ordered a new MRI. The last MRI of his lumbar spine was approximately 4 months ago. He will follow-up with the neurologist.  He did see the anesthesiologist Dr. Olga Andersen, he is scheduled for a trial of lumbar epidural steroid injections. He will continue to use the Valium as needed but if he feels Zanaflex is just as effective and does not need the Valium and he may just follow up with our clinic on an as-needed basis. Otherwise I have requested a 3 month follow-up. Measuring clinical outcomes of chronic pain patients. This was reviewed today. The survey will be scanned. Please see the survey for details. Total score-4    Back Pain    Associated symptoms include weight loss and headaches. Pertinent negatives include no dysuria. Buttocks pain   Associated symptoms include headaches. Pertinent negatives include no shortness of breath.        Review of Systems   Constitutional: Positive for chills, malaise/fatigue and weight loss. HENT: Negative for hearing loss. Eyes: Negative for pain and discharge. Respiratory: Negative for cough and shortness of breath. Cardiovascular: Negative for claudication and leg swelling. Gastrointestinal: Positive for diarrhea and heartburn. Genitourinary: Negative for dysuria and urgency. Musculoskeletal: Positive for back pain and joint pain. Skin: Negative for itching and rash. Neurological: Positive for headaches. Negative for dizziness and seizures. Endo/Heme/Allergies: Positive for environmental allergies. Does not bruise/bleed easily. Psychiatric/Behavioral: Negative for substance abuse and suicidal ideas. The patient has insomnia. Physical Exam   Constitutional: He appears well-developed and well-nourished. He is cooperative. He does not have a sickly appearance. HENT:   Head: Normocephalic and atraumatic. Right Ear: External ear normal. No drainage. Left Ear: External ear normal. No drainage. Nose: Nose normal.   Eyes: Lids are normal. Right eye exhibits no discharge. Left eye exhibits no discharge. Right conjunctiva has no hemorrhage. Left conjunctiva has no hemorrhage. Neck: Neck supple. No tracheal deviation present. No thyroid mass present. Pulmonary/Chest: Effort normal. No respiratory distress. Neurological: He is alert. No cranial nerve deficit. Skin: Skin is intact. No rash noted. Psychiatric: His speech is normal. His affect is not angry. He does not express inappropriate judgment. Nursing note and vitals reviewed. ASSESSMENT and PLAN  Encounter Diagnoses   Name Primary?     Chronic midline low back pain with right-sided sciatica Yes    Chronic pain syndrome     PTSD (post-traumatic stress disorder)     Neck pain     History of depression     DDD (degenerative disc disease), cervical     Degenerative disc disease, lumbar     Degenerative joint disease of cervical and lumbar spine  Spondylosis of lumbar region without myelopathy or radiculopathy     Lumbar degenerative disc disease     Lumbar radiculitis    No indicators for recent medication misuse.  reviewed. Pain Meds and Quality Of Life have been reviewed. Nonpharmacologic therapy and non-opioid pharmacologic therapy were considered. If opioid therapy is prescribed, this is only if the expected benefits are anticipated to outweigh risks. Possible changes to treatment plan considered. Support/education given as needed. Today-medications are as listed. No significant changes to medications. Follow up -- 3 months.

## 2017-09-06 NOTE — PROGRESS NOTES
Nursing Notes    Patient presents to the office today in follow-up. Patient rates his pain at 8/10 on the numerical pain scale. Reviewed medications with counts as follows:    Rx Date filled Qty Dispensed Pill Count Last Dose Short   Valium 2 mg - should be one refill on the bottle. Medication not brought to the appt. Pt was counseled. 07/11/17 - per  90 ? yesterday no                                 POC UDS was not performed in office today. Any new labs or imaging since last appointment? NO    Have you been to an emergency room (ER) or urgent care clinic since your last visit? NO            Have you been hospitalized since your last visit? NO     If yes, where, when, and reason for visit? Have you seen or consulted any other health care providers outside of the [address] [place] [address]  since your last visit? NO     If yes, where, when, and reason for visit? HM deferred to pcp.

## 2017-09-06 NOTE — MR AVS SNAPSHOT
Visit Information Date & Time Provider Department Dept. Phone Encounter #  
 9/6/2017  8:45 AM Ana Lilia Diaz MD [facility] [id number] Follow-up Instructions Return in about 3 months (around 12/6/2017). Upcoming Health Maintenance Date Due DTaP/Tdap/Td series (1 - Tdap) 9/28/2003 INFLUENZA AGE 9 TO ADULT 8/1/2017 Allergies as of 9/6/2017  Review Complete On: 9/6/2017 By: Ana Lilia Diaz MD  
 No Known Allergies Current Immunizations  Never Reviewed No immunizations on file. Not reviewed this visit You Were Diagnosed With   
  
 Codes Comments Chronic midline low back pain with right-sided sciatica    -  Primary ICD-10-CM: M54.41, G89.29 ICD-9-CM: 724.2, 724.3, 338.29 Chronic pain syndrome     ICD-10-CM: G89.4 ICD-9-CM: 338.4 PTSD (post-traumatic stress disorder)     ICD-10-CM: F43.10 ICD-9-CM: 309.81 Neck pain     ICD-10-CM: M54.2 ICD-9-CM: 723.1 History of depression     ICD-10-CM: Z86.59 
ICD-9-CM: V11.8 DDD (degenerative disc disease), cervical     ICD-10-CM: M50.30 ICD-9-CM: 722.4 Degenerative disc disease, lumbar     ICD-10-CM: M51.36 
ICD-9-CM: 722.52 Degenerative joint disease of cervical and lumbar spine     ICD-10-CM: M47.812, M47.816 ICD-9-CM: 721.0, 721.3 Spondylosis of lumbar region without myelopathy or radiculopathy     ICD-10-CM: M47.816 ICD-9-CM: 721.3 Lumbar degenerative disc disease     ICD-10-CM: M51.36 
ICD-9-CM: 722.52 Lumbar radiculitis     ICD-10-CM: M54.16 
ICD-9-CM: 724.4 Vitals BP Pulse Temp Resp Height(growth percentile) Weight(growth percentile) (!) 129/93 (!) 59 97.2 °F (36.2 °C) 18 5' 11\" (1.803 m) 180 lb (81.6 kg) BMI Smoking Status 25.1 kg/m2 Never Smoker BMI and BSA Data Body Mass Index Body Surface Area  
 25.1 kg/m 2 2.02 m 2 Your Updated Medication List  
  
   
 This list is accurate as of: 9/6/17  8:55 AM.  Always use your most recent med list.  
  
  
  
  
 CeleBREX 200 mg capsule Generic drug:  celecoxib Take  by mouth two (2) times a day. cetirizine 10 mg tablet Commonly known as:  ZYRTEC Take  by mouth. diazePAM 2 mg tablet Commonly known as:  VALIUM Take 1 Tab by mouth three (3) times daily as needed for Anxiety. Max Daily Amount: 6 mg. FLUoxetine 20 mg tablet Commonly known as:  PROzac Take 20 mg by mouth daily. gabapentin 400 mg capsule Commonly known as:  NEURONTIN Take 400 mg by mouth three (3) times daily. losartan-hydroCHLOROthiazide 50-12.5 mg per tablet Commonly known as:  HYZAAR Take 1 Tab by mouth daily. LYRICA 50 mg capsule Generic drug:  pregabalin Take 50 mg by mouth four (4) times daily. traMADol 50 mg tablet Commonly known as:  ULTRAM  
Take 50 mg by mouth every eight (8) hours as needed for Pain.  
  
 zolpidem 10 mg tablet Commonly known as:  AMBIEN Take  by mouth nightly as needed for Sleep. Prescriptions Printed Refills  
 diazePAM (VALIUM) 2 mg tablet 2 Sig: Take 1 Tab by mouth three (3) times daily as needed for Anxiety. Max Daily Amount: 6 mg. Class: Print Route: Oral  
  
Follow-up Instructions Return in about 3 months (around 12/6/2017). To-Do List   
 09/11/2017 6:15 PM  
  Appointment with [facility] RM 1 at [facility] RAD MRI ([phone]) GENERAL INSTRUCTIONS  Bring information (ID card) if you have any medically implanted devices. You will be required to lie still while the procedure is being performed. Remove any jewelry (including body piercing, hairpins) prior to MRI. If you have had a creatinine level drawn within the past 30 days, please bring most recent results to your appt.   Bring any films, CD's, and reports related to your study with you on the day of your exam.  This only includes studies done outside of [facility] Negin Aguiar, [facility], [facility] [address], and [facility]. Bring a complete list of all medications you are currently taking to include prescriptions, over-the-counter meds, herbals, vitamins & any dietary supplements. If you were given medications for claustrophobia or anxiety, please arrange to have someone drive you to your appointment. QUESTIONS  Notify the MRI Department if you have any questions concerning your study. [facility] [address] - [phone] [address] - [phone] Introducing \[facility]\"" & HEALTH SERVICES! [facility] introduces [employer] patient portal. Now you can access parts of your medical record, email your doctor's office, and request medication refills online. 1. In your internet browser, go to [email or web address] 2. Click on the First Time User? Click Here link in the Sign In box. You will see the New Member Sign Up page. 3. Enter your [employer] Access Code exactly as it appears below. You will not need to use this code after youve completed the sign-up process. If you do not sign up before the expiration date, you must request a new code. · [employer] Access Code: [email or web address] Expires: 12/4/2017 12:35 PM 
 
4. Enter the last four digits of your Social Security Number (xxxx) and Date of Birth (mm/dd/yyyy) as indicated and click Submit. You will be taken to the next sign-up page. 5. Create a Phase [employer] Development[employer] ID. This will be your [employer] login ID and cannot be changed, so think of one that is secure and easy to remember. 6. Create a [employer] password. You can change your password at any time. 7. Enter your Password Reset Question and Answer. This can be used at a later time if you forget your password. 8. Enter your e-mail address. You will receive e-mail notification when new information is available in [address] 9. Click Sign Up. You can now view and download portions of your medical record. 10. Click the Download Summary menu link to download a portable copy of your medical information. If you have questions, please visit the Frequently Asked Questions section of the [employer] website. Remember, [employer] is NOT to be used for urgent needs. For medical emergencies, dial 911. Now available from your iPhone and Android! Please provide this summary of care documentation to your next provider. Your primary care clinician is listed as Phys Other. If you have any questions after today's visit, please call [phone].

## 2017-09-08 RX ORDER — MIDAZOLAM HYDROCHLORIDE 1 MG/ML
.5-6 INJECTION, SOLUTION INTRAMUSCULAR; INTRAVENOUS
Status: CANCELLED | OUTPATIENT
Start: 2017-09-11

## 2017-09-08 RX ORDER — SODIUM CHLORIDE 0.9 % (FLUSH) 0.9 %
5-10 SYRINGE (ML) INJECTION AS NEEDED
Status: CANCELLED | OUTPATIENT
Start: 2017-09-11

## 2017-09-11 ENCOUNTER — HOSPITAL ENCOUNTER (OUTPATIENT)
Dept: MRI IMAGING | Age: 37
Discharge: HOME | End: 2017-09-11
Attending: PHYSICAL MEDICINE & REHABILITATION
Payer: OTHER GOVERNMENT

## 2017-09-11 DIAGNOSIS — M47.816 SPONDYLOSIS OF LUMBAR REGION WITHOUT MYELOPATHY OR RADICULOPATHY: ICD-10-CM

## 2017-09-11 DIAGNOSIS — M51.36 LUMBAR DEGENERATIVE DISC DISEASE: ICD-10-CM

## 2017-09-11 DIAGNOSIS — G89.4 CHRONIC PAIN SYNDROME: ICD-10-CM

## 2017-09-11 DIAGNOSIS — M47.816 DEGENERATIVE JOINT DISEASE OF CERVICAL AND LUMBAR SPINE: ICD-10-CM

## 2017-09-11 DIAGNOSIS — M47.812 DEGENERATIVE JOINT DISEASE OF CERVICAL AND LUMBAR SPINE: ICD-10-CM

## 2017-09-11 PROCEDURE — 72148 MRI LUMBAR SPINE W/O DYE: CPT

## 2017-09-14 RX ORDER — SODIUM CHLORIDE 0.9 % (FLUSH) 0.9 %
5-10 SYRINGE (ML) INJECTION AS NEEDED
Status: CANCELLED | OUTPATIENT
Start: 2017-09-18

## 2017-09-14 RX ORDER — MIDAZOLAM HYDROCHLORIDE 1 MG/ML
.5-6 INJECTION, SOLUTION INTRAMUSCULAR; INTRAVENOUS
Status: CANCELLED | OUTPATIENT
Start: 2017-09-18

## 2017-09-18 ENCOUNTER — HOSPITAL ENCOUNTER (OUTPATIENT)
Age: 37
Setting detail: OUTPATIENT SURGERY
Discharge: HOME | End: 2017-09-18
Attending: PHYSICAL MEDICINE & REHABILITATION | Admitting: PHYSICAL MEDICINE & REHABILITATION
Payer: OTHER GOVERNMENT

## 2017-09-18 ENCOUNTER — APPOINTMENT (OUTPATIENT)
Dept: GENERAL RADIOLOGY | Age: 37
End: 2017-09-18
Attending: PHYSICAL MEDICINE & REHABILITATION
Payer: OTHER GOVERNMENT

## 2017-09-18 VITALS
TEMPERATURE: 98.5 F | OXYGEN SATURATION: 97 % | HEIGHT: 71 IN | SYSTOLIC BLOOD PRESSURE: 128 MMHG | BODY MASS INDEX: 25.2 KG/M2 | RESPIRATION RATE: 16 BRPM | HEART RATE: 60 BPM | WEIGHT: 180 LBS | DIASTOLIC BLOOD PRESSURE: 83 MMHG

## 2017-09-18 PROCEDURE — 74011250636 HC RX REV CODE- 250/636

## 2017-09-18 PROCEDURE — 76010000009 HC PAIN MGT 0 TO 30 MIN PROC: Performed by: PHYSICAL MEDICINE & REHABILITATION

## 2017-09-18 PROCEDURE — 74011636320 HC RX REV CODE- 636/320

## 2017-09-18 PROCEDURE — 74011000250 HC RX REV CODE- 250

## 2017-09-18 RX ORDER — FENTANYL CITRATE 50 UG/ML
INJECTION, SOLUTION INTRAMUSCULAR; INTRAVENOUS AS NEEDED
Status: DISCONTINUED | OUTPATIENT
Start: 2017-09-18 | End: 2017-09-18 | Stop reason: HOSPADM

## 2017-09-18 RX ORDER — BETAMETHASONE SODIUM PHOSPHATE AND BETAMETHASONE ACETATE 3; 3 MG/ML; MG/ML
INJECTION, SUSPENSION INTRA-ARTICULAR; INTRALESIONAL; INTRAMUSCULAR; SOFT TISSUE AS NEEDED
Status: DISCONTINUED | OUTPATIENT
Start: 2017-09-18 | End: 2017-09-18 | Stop reason: HOSPADM

## 2017-09-18 RX ORDER — LIDOCAINE HYDROCHLORIDE 10 MG/ML
INJECTION, SOLUTION EPIDURAL; INFILTRATION; INTRACAUDAL; PERINEURAL AS NEEDED
Status: DISCONTINUED | OUTPATIENT
Start: 2017-09-18 | End: 2017-09-18 | Stop reason: HOSPADM

## 2017-09-18 RX ORDER — SODIUM CHLORIDE 0.9 % (FLUSH) 0.9 %
5-10 SYRINGE (ML) INJECTION AS NEEDED
Status: DISCONTINUED | OUTPATIENT
Start: 2017-09-18 | End: 2017-09-18 | Stop reason: HOSPADM

## 2017-09-18 RX ORDER — MIDAZOLAM HYDROCHLORIDE 1 MG/ML
.5-6 INJECTION, SOLUTION INTRAMUSCULAR; INTRAVENOUS
Status: DISCONTINUED | OUTPATIENT
Start: 2017-09-18 | End: 2017-09-18 | Stop reason: HOSPADM

## 2017-09-18 NOTE — INTERVAL H&P NOTE
H&P Update:  Margaret Eddy was seen and examined. History and physical has been reviewed. The patient has been examined.  There have been no significant clinical changes since the completion of the originally dated History and Physical.    Signed By: Landy Larios MD     September 18, 2017 7:42 AM

## 2017-09-18 NOTE — PROCEDURES
[facility] [facility]    LUMBAR EPIDURAL STEROID INJECTION  PROCEDURE REPORT      PATIENT:  Julio Current  YOB: 1980  DATE OF SERVICE:  9/18/2017  [facility]    PRE-PROCEDURE DIAGNOSIS:  See Above    POST-PROCEDURE DIAGNOSIS:  See Above                PROCEDURE:    1. Lumbar Interlaminar epidural steroid injection, L4-5 (66693)  2. Fluoroscopic needle guidance (spinal) (64618)        3. Supervision of moderate sedation (37095)    ANESTHESIA:  Local with moderate IV sedation. See Medication Administration Record for specific medications and dosage. COMPLICATIONS: None. PHYSICIAN:  Nini Landrum MD    PRE-PROCEDURE NOTE:  Pre-procedural assessment of the patient was performed including a limited history and physical examination. The details of the procedure were discussed with the patient, including the risks, benefits and alternative options and an informed consent was obtained. The patients NPO status, if necessary for the specific procedure and/or administration of moderate intravenous sedation, if utilized, and availability of a responsible adult to escort the patient following the procedure were confirmed. PROCEDURE NOTE:  The patient was brought to the procedure suite and positioned on the fluoroscopy table in the prone position. Physiologic monitors were applied and supplemental oxygen was administered via nasal cannula. The skin was prepped in the standard surgical fashion and sterile drapes were applied over the procedure site. Please refer to the Flowsheet for documentation of the patients vital signs and the Medication Administration Record for any oral and/or intravenous sedation administered prior to or during the procedure. The above-listed interlaminar space was identified and the skin and subcutaneous tissues were infiltrated with 1% Lidocaine.   Under anterior-posterior fluoroscopic guidance an 18g, 3.5-inch Tuohy epidural needle was advanced along the previously identified interlaminar space. The fluoroscope was then turned lateral view and a loss of resistance syringe was attached to the needle containing preservative free normal saline. Under lateral flouroscopic guidance, the needle was then advanced through the ligamentum flavum, entering the epidural space with a clear and crisp loss of resistance. The needle was not advanced beyond the interlaminar line at any time during this process. Aspiration was negative for blood or CSF. Additional confirmation was made with the injection of 1 mL of a nonionic water-soluble radiographic contrast medium (Isovue-M 200). Following this, 4 mL of a solution comprised of 2 mL of lidocaine 1% and 2mL betamethasone (6mg/ml) was injected slowly through the epidural needle. The needle was cleared of steroid solution and removed. The area was thoroughly cleaned and sterile bandages applied as necessary. The patient tolerated the procedure well and vital signs remained stable throughout the procedure. POST-PROCEDURE COURSE:   The patient was escorted from the procedure suite in satisfactory condition and recovered per facility protocol based on the type of procedure performed and/or the sedation utilized. The patient did not experience any adverse events and remained hemodynamically stable during the post-procedure period. DISCHARGE NOTE:  Upon discharge, the patient was able to tolerate fluids and was in no acute distress. The patient was oriented to person, place and time and vital signs were stable. Appropriate post-procedure instructions were provided and explained to the patient in detail and all questions were answered.     Lonnie Knox MD 9/18/2017 12:14 PM

## 2017-09-18 NOTE — DISCHARGE INSTRUCTIONS
[address] [place] [address] for Pain Management      Post Procedures Instructions    *Resume Diet and Activity as tolerated. Rest for the remainder of the day. *You may fell worse before you feel better as the numbing medications wear off before the steroids take effect if used for your procedures. *Do not use affected extremity until numbness or loss of sensation has completely resolved without assistance. *DO NOT DRIVE, operate machinery/heavey equipment for 24 hours. *DO NOT DRINK ALCOHOL for 24 hours as it may interact with the sedation if you received it and also thins your blood and may cause you to bleed. *WAIT 24 hours before starting back ANY Blood thinning medications:   (Heparin, Coumadin, Warfarin, Lovenox, Plavix, Aggrenox)    *Resume Pre-Procedure Medications as prescribed except Blood Thinners unless directed by your Physician or Cardiologist.     *Avoid Hot tubs and Heating pad for 24 hours to prevent dissipation of medications, you may shower to remove bandages and remaining prep residue on the skin. * If you develop a Headache, drink plenty of fluids including beverages with caffeine (Coffee, Mt. Dew etc.) and rest.  If the headache persists longer than 24 hoursor intensifies - Please call [facility] [phone]      * If you are DIABETIC, check your blood sugar three times a day for the next three days, the steroids will increase your blood sugar. If your blood sugar is greater than 400 have someone drive you to the nearest [address]. * If you experience any of the following problems, call the [facility] [phone] between 8:00 am - 4:30pm or After Hours [phone].     Shortness of breath    Fever of 101 F or higher    Nausea / Vomiting (not normal to you)    Increasing stiffness in the neck    Weakness or numbness in the arms or legs that is not resolving    Prolonged and increasing pain > than 4 days    ANYTHING OUT of the ORDINARY TO YOU    If YOU are experiencing a severe reaction / complication that you have never had before post procedure, call 911 or go to the nearest emergency room! All patients must have a  for transportation [place] regardless if you do or do not receive sedation. DISCHARGE SUMMARY from Nurse      PATIENT INSTRUCTIONS:    After Oral  or intravenous sedation, for 24 hours or while taking prescription Narcotics:  · Limit your activities  · Do not drive and operate hazardous machinery  · Do not make important personal or business decisions  · Do  not drink alcoholic beverages  · If you have not urinated within 8 hours after discharge, please contact your surgeon on call. Report the following to your surgeon:  · Excessive pain, swelling, redness or odor of or around the surgical area  · Temperature over 101  · Nausea and vomiting lasting longer than 4 hours or if unable to take medications  · Any signs of decreased circulation or nerve impairment to extremity: change in color, persistent  numbness, tingling, coldness or increase pain  · Any questions        What to do at Home:  Recommended activity: Activity as tolerated, NO DRIVING FOR 24 Hours post injection          *  Please give a list of your current medications to your Primary Care Provider. *  Please update this list whenever your medications are discontinued, doses are      changed, or new medications (including over-the-counter products) are added. *  Please carry medication information at all times in case of emergency situations. These are general instructions for a healthy lifestyle:    No smoking/ No tobacco products/ Avoid exposure to second hand smoke    Surgeon General's Warning:  Quitting smoking now greatly reduces serious risk to your health.     Obesity, smoking, and sedentary lifestyle greatly increases your risk for illness    A healthy diet, regular physical exercise & weight monitoring are important for maintaining a healthy lifestyle    You may be retaining fluid if you have a history of heart failure or if you experience any of the following symptoms:  Weight gain of 3 pounds or more overnight or 5 pounds in a week, increased swelling in our hands or feet or shortness of breath while lying flat in bed. Please call your doctor as soon as you notice any of these symptoms; do not wait until your next office visit. Recognize signs and symptoms of STROKE:    F-face looks uneven    A-arms unable to move or move unevenly    S-speech slurred or non-existent    T-time-call 911 as soon as signs and symptoms begin-DO NOT go       Back to bed or wait to see if you get better-TIME IS BRAIN. [employer] Activation    Thank you for requesting access to [employer]. Please follow the instructions below to securely access and download your online medical record. [employer] allows you to send messages to your doctor, view your test results, renew your prescriptions, schedule appointments, and more. How Do I Sign Up? 1. In your internet browser, go to [email or web address]  2. Click on the First Time User? Click Here link in the Sign In box. You will be redirect to the New Member Sign Up page. 3. Enter your [employer] Access Code exactly as it appears below. You will not need to use this code after youve completed the sign-up process. If you do not sign up before the expiration date, you must request a new code. [employer] Access Code: Activation code not generated  Current [employer] Status: Active (This is the date your [employer] access code will )    4. Enter the last four digits of your Social Security Number (xxxx) and Date of Birth (mm/dd/yyyy) as indicated and click Submit. You will be taken to the next sign-up page. 5. Create a [employer] ID. This will be your [employer] login ID and cannot be changed, so think of one that is secure and easy to remember.   6. Create a [employer] password. You can change your password at any time. 7. Enter your Password Reset Question and Answer. This can be used at a later time if you forget your password. 8. Enter your e-mail address. You will receive e-mail notification when new information is available in [address] 9. Click Sign Up. You can now view and download portions of your medical record. 10. Click the Download Summary menu link to download a portable copy of your medical information. Additional Information    If you have questions, please visit the Frequently Asked Questions section of the [employer] website at [email or web address] Remember, [employer] is NOT to be used for urgent needs. For medical emergencies, dial 911.

## 2017-09-18 NOTE — H&P (VIEW-ONLY)
Nursing Notes    Patient presents to the office today in follow-up. Patient rates his pain at 8/10 on the numerical pain scale. Reviewed medications with counts as follows:    Rx Date filled Qty Dispensed Pill Count Last Dose Short   Valium 2 mg - should be one refill on the bottle. Medication not brought to the appt. Pt was counseled. 07/11/17 - per  90 ? yesterday no                                 POC UDS was not performed in office today. Any new labs or imaging since last appointment? NO    Have you been to an emergency room (ER) or urgent care clinic since your last visit? NO            Have you been hospitalized since your last visit? NO     If yes, where, when, and reason for visit? Have you seen or consulted any other health care providers outside of the [address] [place] [address]  since your last visit? NO     If yes, where, when, and reason for visit? HM deferred to pcp.

## 2017-09-26 ENCOUNTER — TELEPHONE (OUTPATIENT)
Dept: PAIN MANAGEMENT | Age: 37
End: 2017-09-26

## 2017-09-26 NOTE — TELEPHONE ENCOUNTER
Mr. Travis Hood was contacted for follow-up status post Lumbar Interlaminar epidural steroid injection, L4-5  on September 18, 2017.  He reports:    Pre-procedure numerical pain score: 10/10  Post-procedure numerical pain score one week after: 2/10  Duration of relief post-procedure (if applicable): 1 weeks  Improvement in functional activities (if applicable): Yes  Percentage of overall improvement: 95%    COMMENTS:

## 2017-10-19 ENCOUNTER — OFFICE VISIT (OUTPATIENT)
Dept: PAIN MANAGEMENT | Age: 37
End: 2017-10-19

## 2017-10-19 VITALS
TEMPERATURE: 97.5 F | DIASTOLIC BLOOD PRESSURE: 75 MMHG | WEIGHT: 188 LBS | HEIGHT: 71 IN | SYSTOLIC BLOOD PRESSURE: 124 MMHG | BODY MASS INDEX: 26.32 KG/M2 | HEART RATE: 64 BPM | RESPIRATION RATE: 12 BRPM

## 2017-10-19 DIAGNOSIS — M47.812 SPONDYLOSIS OF CERVICAL REGION WITHOUT MYELOPATHY OR RADICULOPATHY: ICD-10-CM

## 2017-10-19 DIAGNOSIS — M54.12 CERVICAL RADICULOPATHY: Primary | ICD-10-CM

## 2017-10-19 DIAGNOSIS — G89.4 CHRONIC PAIN SYNDROME: ICD-10-CM

## 2017-10-19 DIAGNOSIS — M50.30 DDD (DEGENERATIVE DISC DISEASE), CERVICAL: ICD-10-CM

## 2017-10-19 NOTE — PROGRESS NOTES
[address] [facility]  Interventional Pain Management Consultation History & Physical    PATIENT NAME:  Mike Borjas     YOB: 1980    DATE OF SERVICE:   10/19/2017      CHIEF COMPLAINT:  Back Pain      REASON FOR VISIT:   Mike Borjas presents to the pain clinic today for follow on evaluation and to consider interventional pain management options as indicated for the type and location of the pain the patient is presenting with. HISTORY OF PRESENT ILLNESS:   Patient returns for follow-up evaluation after his lumbar epidural steroid injection at interlaminar lumbar L4-5 level with IV conscious sedation. He states this helped his low back and hip pain immeasurably. He has no pain of his low back or his hip at today's presentation. He presents with a new pain symptom, he has chronic neck and radiating bilateral upper extremity symptoms. He has pain in the lower part of his cervical spine, this radiates down both of his arms medial aspect posterior aspect of both arms. He has numbness tingling as well as pain smallest 3 fingers on either arm. He has subjective weakness at times with this bilateral hand grasp. He states he has tremors of his smallest 3 fingers. Medications only minimally helpful. He would like to be considered for same procedure that we treated his low back pain which works so well for him. We reviewed his CT imaging of his neck again. This is significant for cervical degenerative disc changes lower cervical levels. Mild to moderate cervical spinal canal narrowing and neuroforaminal stenoses are noted. Cervical spondylotic changes are noted. ASSESSMENT/OPTIONS: as follows. We discussed options.   This gentleman is presenting today with a new pain complaint, he has signs and symptoms, as well as exam and imaging evidence suggestive of cervical radiculopathy secondary to cervical spinal stenosis cervical degenerative disc disease. I will set him up for series of cervical epidural steroid injections with IV conscious sedation. Insert risk and benefit               MRI Results (most recent):    Results from Hospital Encounter encounter on 09/11/17   MRI LUMB SPINE WO CONT   Narrative EXAMINATION: MRI lumbar spine without contrast    INDICATION: Greater than 2 months lumbar pain, right buttock pain    COMPARISON: None    TECHNIQUE: Sagittal and axial multiecho MRI sequences of the lumbar spine  performed without contrast.    FINDINGS:    General: Lumbosacral transitional anatomy. For the purposes of segmentation, L5  will be considered partially sacralized with partially fused pseudoarticulations  and hypoplastic L5-S1 disc. Mild disc space loss at L2-L3. Vertebral body  heights are preserved. No focal spondylolisthesis. Lumbar lordosis maintained. Conus ends at level L1. No abnormal signal in the distal cord. No abnormal  epidural collection identified. No suspicious marrow lesions. Miscellaneous: Superficial soft tissues unremarkable. Levels:    T11-T12: Evaluated sagittal plane only. No significant degenerative change or  stenosis. T12-L1: No significant degenerative change or stenosis. L1-L2: No significant degenerative change or stenosis. L2-L3: Mild disc space loss with mild disc bulge and small right foraminal  protrusion. Mild facet arthropathy. Minimal spinal canal narrowing. Minimal  right foraminal narrowing. Left foramen patent. L3-L4: No significant disc disease. Mild facet arthropathy. Spinal canal patent. Minimal left foraminal narrowing. Right foramen patent. L4-L5: No significant disc disease. Mild facet arthropathy. Spinal canal patent. Mild bilateral foraminal stenoses. L5-S1: Transitional level. No significant disc disease. Fused facets. No  stenosis. Imaged sacrum: Unremarkable. Impression IMPRESSION:    1. No significant spinal canal stenosis at any level.  Multilevel mild foraminal  stenoses. Multilevel mild degenerative changes. See level by level details  above.    -Lumbosacral transitional anatomy. L5 is considered partially sacralized for the  purposes of this interpretation. PAST MEDICAL HISTORY:   The patient  has a past medical history of Depression; Headache; Hypertension; Insomnia; Meningitis; and PTSD (post-traumatic stress disorder). PAST SURGICAL HISTORY:   The patient  has a past surgical history that includes urological.    CURRENT MEDICATIONS:   The patient has a current medication list which includes the following prescription(s): pregabalin, diazepam, celecoxib, losartan-hydrochlorothiazide, gabapentin, fluoxetine, tramadol, zolpidem, and cetirizine. ALLERGIES:   No Known Allergies    FAMILY HISTORY:   The patient family history includes Heart Disease in his father; Hypertension in his father and mother. SOCIAL HISTORY:   The patient  reports that he has never smoked. He has never used smokeless tobacco. The patient  reports that he does not drink alcohol. He also  reports that he does not use illicit drugs. REVIEW OF SYSTEMS:    The patient denies fever, chills, weight loss (Constitutional), rash, itching (Skin), tinnitus, congestion (HENT), blurred vision, photophobia (Eyes), palpitations, orthopnea (Cardiovascular), hemoptysis, wheezing (Respiratory), nausea, vomiting, diarrhea (Gastrointestinal), dysuria, hematuria, urgency (Genitourinary), bowel or bladder incontinence, loss of consciousness (Neurologic), suicidal or homicidal ideation or hallucinations (Psychiatric). Denies swelling, axillary or groin masses (Lymphatic). PHYSICAL EXAM:  VS:   Visit Vitals    /75    Pulse 64    Temp 97.5 °F (36.4 °C)    Resp 12    Ht 5' 11\" (1.803 m)    Wt 85.3 kg (188 lb)    BMI 26.22 kg/m2     General: Well-developed and well-nourished. Body habitus consistent with recorded height and weight and the calculated BMI. Apparent distress due to neck and upper extremity symptoms. Head: Normocephalic, atraumatic. Skin: Inspection of the skin reveals no rashes, lesions or infection. CV: Regular rate. No murmurs or rubs noted. No peripheral edema noted. Pulm: Respirations are even and unlabored. Extr: No clubbing, cyanosis, or edema noted. Musculoskeletal:  1. Cervical spine -decreased range of motion bilateral . Paraspinous tenderness. There is no scoliosis, asymmetry, or musculoskeletal defect. 2. Thoracic spine - Full ROM. No paraspinous tenderness at any level. There is no scoliosis, asymmetry, or musculoskeletal defect. 3. Lumbar spine - Full ROM. No paraspinous tenderness at any level. SI joints are nontender bilaterally. There is no scoliosis, asymmetry, or musculoskeletal defect. 4. Right upper extremity - Full ROM. 5/5 muscle strength in all muscle groups. No pain or tenderness in shoulder, elbow, wrist, or hand. 5. Left upper extremity - Full ROM. 5/5 muscle strength in all muscle groups. No pain or tenderness in shoulder, elbow, wrist, or hand. 6. Right lower extremity - Full ROM. 5/5 muscle strength in all muscle groups. No pain, tenderness, or swelling in the hip, knee, ankle or foot. 7. Left lower extremity - Full ROM. 5/5 muscle strength in all muscle groups. No pain, tenderness, or swelling in the hip, knee, ankle or foot. Neurological:  1. Mental Status - Alert, awake and oriented. Speech is clear and appropriate. 2. Cranial Nerves - Extraocular muscles intact bilaterally. Cranial nerves II-XII grossly intact bilaterally. 3. Gait - Non-antalgic   4. Reflexes - 2+ and symmetric throughout. 5. Sensation - Intact to light touch and pin prick. 6. Provocative Tests - Spurlings negative bilaterally. Psychological:  1. Mood and affect - Appropriate. 2. Speech - Appropriate. 3. Though content - Appropriate. 4. Judgment - Appropriate. ASSESSMENT:      ICD-10-CM ICD-9-CM    1.  Cervical radiculopathy M54.12 723.4    2. DDD (degenerative disc disease), cervical M50.30 722.4    3. Spondylosis of cervical region without myelopathy or radiculopathy M47.812 721.0    4. Chronic pain syndrome G89.4 338.4            PLAN:    1.    I have thoroughly discussed the risks and benefits, indications, contraindications, and side effects of any and procedures that were mentioned at today's patient visit. I have used a skeleton model to explain all procedures, as well as to provide added emphasis regarding procedures and as well for patient education purposes. I have answered all questions in great detail, and I have obtained verbal confirmation for all procedures planned with the patient. 3.    I have reviewed in great detail today the patient's MRI and other imaging studies with the patient. I have explained to the patient their condition using both actual recent and relevant images insofar as I am able to obtain actual images. I have used a skeleton model for added emphasis as well as patient education. 4.    I have advised patient to have a primary care provider continue to care for their health maintenance and general medical conditions. 5,    I have placed appropriate referrals to specialty care providers as I have deemed necessary through today's clinical consultation with the patient. 5.    I have explained to the patient that if any significant side effects, issues, problems, concerns, or perceived complications may have arisen at around the time of the patient's procedures, they should either call the pain management clinic or go to the emergency room immediately for medical provider evaluation. 6.   I have encouraged all patients to call the pain management clinic with any questions or concerns that they may have pertaining to their procedures. DISPOSITION:   The patients condition and plan were discussed at length and all questions were answered. The patient agrees with the plan.     A total of 15 minutes was spent with the patient of which over half of the time was spent counseling the patient. Gus Cedeño MD 10/19/2017 1:26 PM    Note: Although these clinic notes were documented by the provider at the time of the exam, they have not been proofed and are subject to transcription variance.

## 2017-10-27 RX ORDER — SODIUM CHLORIDE 0.9 % (FLUSH) 0.9 %
5-10 SYRINGE (ML) INJECTION AS NEEDED
Status: CANCELLED | OUTPATIENT
Start: 2017-11-01

## 2017-10-27 RX ORDER — MIDAZOLAM HYDROCHLORIDE 1 MG/ML
.5-6 INJECTION, SOLUTION INTRAMUSCULAR; INTRAVENOUS
Status: CANCELLED | OUTPATIENT
Start: 2017-11-01

## 2017-11-01 ENCOUNTER — HOSPITAL ENCOUNTER (OUTPATIENT)
Age: 37
Setting detail: OUTPATIENT SURGERY
Discharge: HOME | End: 2017-11-01
Attending: PHYSICAL MEDICINE & REHABILITATION | Admitting: PHYSICAL MEDICINE & REHABILITATION
Payer: OTHER GOVERNMENT

## 2017-11-01 ENCOUNTER — APPOINTMENT (OUTPATIENT)
Dept: GENERAL RADIOLOGY | Age: 37
End: 2017-11-01
Attending: PHYSICAL MEDICINE & REHABILITATION
Payer: OTHER GOVERNMENT

## 2017-11-01 VITALS
DIASTOLIC BLOOD PRESSURE: 85 MMHG | HEART RATE: 53 BPM | HEIGHT: 71 IN | WEIGHT: 198 LBS | RESPIRATION RATE: 18 BRPM | OXYGEN SATURATION: 96 % | BODY MASS INDEX: 27.72 KG/M2 | TEMPERATURE: 98.3 F | SYSTOLIC BLOOD PRESSURE: 133 MMHG

## 2017-11-01 PROCEDURE — 76010000009 HC PAIN MGT 0 TO 30 MIN PROC: Performed by: PHYSICAL MEDICINE & REHABILITATION

## 2017-11-01 PROCEDURE — 74011250636 HC RX REV CODE- 250/636: Performed by: PHYSICAL MEDICINE & REHABILITATION

## 2017-11-01 PROCEDURE — 74011250636 HC RX REV CODE- 250/636

## 2017-11-01 PROCEDURE — 74011000250 HC RX REV CODE- 250: Performed by: PHYSICAL MEDICINE & REHABILITATION

## 2017-11-01 PROCEDURE — 74011636320 HC RX REV CODE- 636/320: Performed by: PHYSICAL MEDICINE & REHABILITATION

## 2017-11-01 PROCEDURE — 74011000250 HC RX REV CODE- 250

## 2017-11-01 PROCEDURE — 74011636320 HC RX REV CODE- 636/320

## 2017-11-01 RX ORDER — DEXAMETHASONE SODIUM PHOSPHATE 100 MG/10ML
INJECTION INTRAMUSCULAR; INTRAVENOUS AS NEEDED
Status: DISCONTINUED | OUTPATIENT
Start: 2017-11-01 | End: 2017-11-01 | Stop reason: HOSPADM

## 2017-11-01 RX ORDER — MIDAZOLAM HYDROCHLORIDE 1 MG/ML
.5-6 INJECTION, SOLUTION INTRAMUSCULAR; INTRAVENOUS
Status: DISCONTINUED | OUTPATIENT
Start: 2017-11-01 | End: 2017-11-01 | Stop reason: HOSPADM

## 2017-11-01 RX ORDER — LIDOCAINE HYDROCHLORIDE 10 MG/ML
INJECTION, SOLUTION EPIDURAL; INFILTRATION; INTRACAUDAL; PERINEURAL AS NEEDED
Status: DISCONTINUED | OUTPATIENT
Start: 2017-11-01 | End: 2017-11-01 | Stop reason: HOSPADM

## 2017-11-01 RX ORDER — FENTANYL CITRATE 50 UG/ML
INJECTION, SOLUTION INTRAMUSCULAR; INTRAVENOUS AS NEEDED
Status: DISCONTINUED | OUTPATIENT
Start: 2017-11-01 | End: 2017-11-01 | Stop reason: HOSPADM

## 2017-11-01 RX ORDER — SODIUM CHLORIDE 0.9 % (FLUSH) 0.9 %
5-10 SYRINGE (ML) INJECTION AS NEEDED
Status: DISCONTINUED | OUTPATIENT
Start: 2017-11-01 | End: 2017-11-01 | Stop reason: HOSPADM

## 2017-11-01 NOTE — PROCEDURES
[facility]    INTERLAMINAR CERVICAL EPIDURAL STEROID INJECTION  PROCEDURE REPORT      PATIENT:  Jay Jackson  YOB: 1980  DATE OF SERVICE:  11/1/2017  [facility]    PRE-PROCEDURE DIAGNOSIS:  See Above    POST-PROCEDURE DIAGNOSIS:  See Above                PROCEDURE:    1. Interlaminar cervical epidural steroid injection, C7-T1  (66345)  2. Fluoroscopic needle guidance (spinal) (51286)  3. Supervision of moderate sedation (31363)    ANESTHESIA:  Local with moderate IV sedation. See Medication Administration Record for specific medications and dosage. COMPLICATIONS: None. PHYSICIAN:  William Cota MD    PRE-PROCEDURE NOTE:  Pre-procedural assessment of the patient was performed including a limited history and physical examination. The details of the procedure were discussed with the patient, including the risks, benefits and alternative options and an informed consent was obtained. The patients NPO status, if necessary for the specific procedure and/or administration of moderate intravenous sedation, if utilized, and availability of a responsible adult to escort the patient following the procedure were confirmed. A peripheral intravenous cannula was placed without difficulty and lactated Ringers solution administered. See nursing notes for details. PROCEDURE NOTE:  The patient was brought to the procedure suite and positioned on the fluoroscopy table in the prone position. Physiologic monitors were applied and supplemental oxygen was administered via nasal cannula. The skin was prepped in the standard surgical fashion and sterile drapes were applied over the procedure site.  Please refer to the Flowsheet for documentation of the patients vital signs and the Medication Administration Record for any oral and/or intravenous sedation administered prior to or during the procedure. The above-listed interlaminar space was identified and the skin and subcutaneous tissues were infiltrated with 1% Lidocaine. Under anterior-posterior fluoroscopic guidance an 18g, 3.5-inch Tuohy epidural needle was advanced along the previously identified interlaminar space. The fluoroscope was then turned lateral view and a loss of resistance syringe was attached to the needle containing preservative free normal saline. Under lateral flouroscopic guidance, the needle was then advanced through the ligamentum flavum, entering the epidural space with a clear and crisp loss of resistance. The needle was not advanced beyond the interlaminar line at any time during this process. No CSF was noted. Aspiration was negative for blood or CSF. Additional confirmation was made with the injection of 0.5 mL of a nonionic water-soluble radiographic contrast medium (Isovue-M 200) demonstrating appropriate epidural spread and the absence of vascular uptake. Following this, a 3 mL solution comprised of 2 mL of dexamethasone (10mg/ml) and 1 mL of lidocaine 1% preservative free was injected slowly after negative aspiration. The needle was cleared of steroid solution and removed. The area was thoroughly cleaned and sterile bandages applied as necessary. The patient tolerated the procedure well and vital signs remained stable throughout the procedure. POST-PROCEDURE COURSE:  The patient was escorted from the procedure suite in satisfactory condition and recovered per facility protocol based on the type of procedure performed and/or the sedation utilized. The patient did not experience any adverse events and remained hemodynamically stable during the post-procedure period. DISCHARGE NOTE:  Upon discharge, the patient was able to tolerate fluids and was in no acute distress. The patient was oriented to person, place and time and vital signs were stable.  Appropriate post-procedure instructions were provided and explained to the patient in detail and all questions were answered.     Kvng Woodward MD 11/1/2017 8:43 AM

## 2017-11-01 NOTE — DISCHARGE INSTRUCTIONS
[facility] Resources for Pain Management      Post Procedures Instructions    *Resume Diet and Activity as tolerated. Rest for the remainder of the day. *You may fell worse before you feel better as the numbing medications wear off before the steroids take effect if used for your procedures. *Do not use affected extremity until numbness or loss of sensation has completely resolved without assistance. *DO NOT DRIVE, operate machinery/heavey equipment for 24 hours. *DO NOT DRINK ALCOHOL for 24 hours as it may interact with the sedation if you received it and also thins your blood and may cause you to bleed. *WAIT 24 hours before starting back ANY Blood thinning medications:   (Heparin, Coumadin, Warfarin, Lovenox, Plavix, Aggrenox)    *Resume Pre-Procedure Medications as prescribed except Blood Thinners unless directed by your Physician or Cardiologist.     *Avoid Hot tubs and Heating pad for 24 hours to prevent dissipation of medications, you may shower to remove bandages and remaining prep residue on the skin. * If you develop a Headache, drink plenty of fluids including beverages with caffeine (Coffee, Mt. Dew etc.) and rest.  If the headache persists longer than 24 hoursor intensifies - Please call [facility] [phone]    * If you are DIABETIC, check your blood sugar three times a day for the next three days, the steroids will increase your blood sugar. If your blood sugar is greater than 400 have someone drive you to the nearest [address]. * If you experience any of the following problems, call the [facility] [phone] between 8:00 am - 4:30pm or After Hours [phone].     Shortness of breath    Fever of 101 F or higher    Nausea / Vomiting (not normal to you)    Increasing stiffness in the neck    Weakness or numbness in the arms or legs that is not resolving    Prolonged and increasing pain > than 4 days    ANYTHING OUT of the ORDINARY TO YOU    If YOU are experiencing a severe reaction / complication that you have never had before post procedure, call 911 or go to the nearest emergency room! All patients must have a  for transportation [facility] regardless if you do or do not receive sedation. DISCHARGE SUMMARY from Nurse      PATIENT INSTRUCTIONS:    After Oral  or intravenous sedation, for 24 hours or while taking prescription Narcotics:  · Limit your activities  · Do not drive and operate hazardous machinery  · Do not make important personal or business decisions  · Do  not drink alcoholic beverages  · If you have not urinated within 8 hours after discharge, please contact your surgeon on call. Report the following to your surgeon:  · Excessive pain, swelling, redness or odor of or around the surgical area  · Temperature over 101  · Nausea and vomiting lasting longer than 4 hours or if unable to take medications  · Any signs of decreased circulation or nerve impairment to extremity: change in color, persistent  numbness, tingling, coldness or increase pain  · Any questions        What to do at Home:  Recommended activity: Activity as tolerated, NO DRIVING FOR 24 Hours post injection          *  Please give a list of your current medications to your Primary Care Provider. *  Please update this list whenever your medications are discontinued, doses are      changed, or new medications (including over-the-counter products) are added. *  Please carry medication information at all times in case of emergency situations. These are general instructions for a healthy lifestyle:    No smoking/ No tobacco products/ Avoid exposure to second hand smoke    Surgeon General's Warning:  Quitting smoking now greatly reduces serious risk to your health.     Obesity, smoking, and sedentary lifestyle greatly increases your risk for illness    A healthy diet, regular physical exercise & weight monitoring are important for maintaining a healthy lifestyle    You may be retaining fluid if you have a history of heart failure or if you experience any of the following symptoms:  Weight gain of 3 pounds or more overnight or 5 pounds in a week, increased swelling in our hands or feet or shortness of breath while lying flat in bed. Please call your doctor as soon as you notice any of these symptoms; do not wait until your next office visit. Recognize signs and symptoms of STROKE:    F-face looks uneven    A-arms unable to move or move unevenly    S-speech slurred or non-existent    T-time-call 911 as soon as signs and symptoms begin-DO NOT go       Back to bed or wait to see if you get better-TIME IS BRAIN.

## 2017-11-01 NOTE — INTERVAL H&P NOTE
H&P Update:  Narayan Wetzel was seen and examined. History and physical has been reviewed. The patient has been examined.  There have been no significant clinical changes since the completion of the originally dated History and Physical.    Signed By: Cedrick Wright MD     November 1, 2017 7:15 AM

## 2017-11-09 ENCOUNTER — TELEPHONE (OUTPATIENT)
Dept: PAIN MANAGEMENT | Age: 37
End: 2017-11-09

## 2017-11-09 NOTE — TELEPHONE ENCOUNTER
Mr. Jesse Avila was contacted for follow-up status post Interlaminar cervical epidural steroid injection, C7-T1  on November 1, 2017.  He reports:    Pre-procedure numerical pain score: 10/10  Post-procedure numerical pain score one week after: 7/10  Duration of relief post-procedure (if applicable): 3 days  Improvement in functional activities (if applicable): Yes  Percentage of overall improvement: 35%    COMMENTS:

## 2017-11-20 RX ORDER — SODIUM CHLORIDE 0.9 % (FLUSH) 0.9 %
5-10 SYRINGE (ML) INJECTION AS NEEDED
Status: CANCELLED | OUTPATIENT
Start: 2017-11-27

## 2017-11-20 RX ORDER — MIDAZOLAM HYDROCHLORIDE 1 MG/ML
.5-6 INJECTION, SOLUTION INTRAMUSCULAR; INTRAVENOUS
Status: CANCELLED | OUTPATIENT
Start: 2017-11-27

## 2017-11-27 ENCOUNTER — APPOINTMENT (OUTPATIENT)
Dept: GENERAL RADIOLOGY | Age: 37
End: 2017-11-27
Attending: PHYSICAL MEDICINE & REHABILITATION
Payer: OTHER GOVERNMENT

## 2017-11-27 ENCOUNTER — HOSPITAL ENCOUNTER (OUTPATIENT)
Age: 37
Setting detail: OUTPATIENT SURGERY
Discharge: HOME | End: 2017-11-27
Attending: PHYSICAL MEDICINE & REHABILITATION | Admitting: PHYSICAL MEDICINE & REHABILITATION
Payer: OTHER GOVERNMENT

## 2017-11-27 VITALS
DIASTOLIC BLOOD PRESSURE: 79 MMHG | SYSTOLIC BLOOD PRESSURE: 133 MMHG | HEART RATE: 50 BPM | OXYGEN SATURATION: 98 % | RESPIRATION RATE: 18 BRPM | TEMPERATURE: 98.4 F

## 2017-11-27 PROCEDURE — 74011636320 HC RX REV CODE- 636/320

## 2017-11-27 PROCEDURE — 99152 MOD SED SAME PHYS/QHP 5/>YRS: CPT | Performed by: PHYSICAL MEDICINE & REHABILITATION

## 2017-11-27 PROCEDURE — 74011250636 HC RX REV CODE- 250/636: Performed by: PHYSICAL MEDICINE & REHABILITATION

## 2017-11-27 PROCEDURE — 76010000009 HC PAIN MGT 0 TO 30 MIN PROC: Performed by: PHYSICAL MEDICINE & REHABILITATION

## 2017-11-27 PROCEDURE — 74011000250 HC RX REV CODE- 250

## 2017-11-27 PROCEDURE — 74011250636 HC RX REV CODE- 250/636

## 2017-11-27 PROCEDURE — 74011000250 HC RX REV CODE- 250: Performed by: PHYSICAL MEDICINE & REHABILITATION

## 2017-11-27 PROCEDURE — 74011636320 HC RX REV CODE- 636/320: Performed by: PHYSICAL MEDICINE & REHABILITATION

## 2017-11-27 RX ORDER — LIDOCAINE HYDROCHLORIDE 10 MG/ML
INJECTION, SOLUTION EPIDURAL; INFILTRATION; INTRACAUDAL; PERINEURAL AS NEEDED
Status: DISCONTINUED | OUTPATIENT
Start: 2017-11-27 | End: 2017-11-27 | Stop reason: HOSPADM

## 2017-11-27 RX ORDER — FENTANYL CITRATE 50 UG/ML
25-400 INJECTION, SOLUTION INTRAMUSCULAR; INTRAVENOUS
Status: DISCONTINUED | OUTPATIENT
Start: 2017-11-27 | End: 2017-11-27 | Stop reason: HOSPADM

## 2017-11-27 RX ORDER — SODIUM CHLORIDE 0.9 % (FLUSH) 0.9 %
5-10 SYRINGE (ML) INJECTION AS NEEDED
Status: DISCONTINUED | OUTPATIENT
Start: 2017-11-27 | End: 2017-11-27 | Stop reason: HOSPADM

## 2017-11-27 RX ORDER — DEXAMETHASONE SODIUM PHOSPHATE 100 MG/10ML
INJECTION INTRAMUSCULAR; INTRAVENOUS AS NEEDED
Status: DISCONTINUED | OUTPATIENT
Start: 2017-11-27 | End: 2017-11-27 | Stop reason: HOSPADM

## 2017-11-27 RX ORDER — MIDAZOLAM HYDROCHLORIDE 1 MG/ML
.5-6 INJECTION, SOLUTION INTRAMUSCULAR; INTRAVENOUS
Status: DISCONTINUED | OUTPATIENT
Start: 2017-11-27 | End: 2017-11-27 | Stop reason: HOSPADM

## 2017-11-27 NOTE — PROCEDURES
[facility]    INTERLAMINAR CERVICAL EPIDURAL STEROID INJECTION  PROCEDURE REPORT      PATIENT:  Elida Jefferson  YOB: 1980  DATE OF SERVICE:  11/27/2017  [facility]    PRE-PROCEDURE DIAGNOSIS:  See Above    POST-PROCEDURE DIAGNOSIS:  See Above                PROCEDURE:    1. Interlaminar cervical epidural steroid injection, C7-T1  (70511)  2. Fluoroscopic needle guidance (spinal) (22695)  3. Supervision of moderate sedation (42668)    ANESTHESIA:  Local with moderate IV sedation. See Medication Administration Record for specific medications and dosage. COMPLICATIONS: None. PHYSICIAN:  Grey Ling MD    PRE-PROCEDURE NOTE:  Pre-procedural assessment of the patient was performed including a limited history and physical examination. The details of the procedure were discussed with the patient, including the risks, benefits and alternative options and an informed consent was obtained. The patients NPO status, if necessary for the specific procedure and/or administration of moderate intravenous sedation, if utilized, and availability of a responsible adult to escort the patient following the procedure were confirmed. A peripheral intravenous cannula was placed without difficulty and lactated Ringers solution administered. See nursing notes for details. PROCEDURE NOTE:  The patient was brought to the procedure suite and positioned on the fluoroscopy table in the prone position. Physiologic monitors were applied and supplemental oxygen was administered via nasal cannula. The skin was prepped in the standard surgical fashion and sterile drapes were applied over the procedure site.  Please refer to the Flowsheet for documentation of the patients vital signs and the Medication Administration Record for any oral and/or intravenous sedation administered prior to or during the procedure. The above-listed interlaminar space was identified and the skin and subcutaneous tissues were infiltrated with 1% Lidocaine. Under anterior-posterior fluoroscopic guidance an 18g, 3.5-inch Tuohy epidural needle was advanced along the previously identified interlaminar space. The fluoroscope was then turned lateral view and a loss of resistance syringe was attached to the needle containing preservative free normal saline. Under lateral flouroscopic guidance, the needle was then advanced through the ligamentum flavum, entering the epidural space with a clear and crisp loss of resistance. The needle was not advanced beyond the interlaminar line at any time during this process. No CSF was noted. Aspiration was negative for blood or CSF. Additional confirmation was made with the injection of 0.5 mL of a nonionic water-soluble radiographic contrast medium (Isovue-M 200) demonstrating appropriate epidural spread and the absence of vascular uptake. Following this, a 3 mL solution comprised of 2 mL of dexamethasone (10mg/ml) and 1 mL of lidocaine 1% preservative free was injected slowly after negative aspiration. The needle was cleared of steroid solution and removed. The area was thoroughly cleaned and sterile bandages applied as necessary. The patient tolerated the procedure well and vital signs remained stable throughout the procedure. POST-PROCEDURE COURSE:  The patient was escorted from the procedure suite in satisfactory condition and recovered per facility protocol based on the type of procedure performed and/or the sedation utilized. The patient did not experience any adverse events and remained hemodynamically stable during the post-procedure period. DISCHARGE NOTE:  Upon discharge, the patient was able to tolerate fluids and was in no acute distress. The patient was oriented to person, place and time and vital signs were stable.  Appropriate post-procedure instructions were provided and explained to the patient in detail and all questions were answered.     Joselin Madera MD 11/27/2017 11:38 AM

## 2017-11-27 NOTE — H&P
26 Nov 2017, 8:07AM.  Patient seen and examined prior to procedure. Chart and data reviewed. No significant interval changes from previous evaluation noted. Stable for procedure as intended and discussed.  [facility]

## 2017-11-27 NOTE — DISCHARGE INSTRUCTIONS
[address] [place] [address] for Pain Management      Post Procedures Instructions    *Resume Diet and Activity as tolerated. Rest for the remainder of the day. *You may fell worse before you feel better as the numbing medications wear off before the steroids take effect if used for your procedures. *Do not use affected extremity until numbness or loss of sensation has completely resolved without assistance. *DO NOT DRIVE, operate machinery/heavey equipment for 24 hours. *DO NOT DRINK ALCOHOL for 24 hours as it may interact with the sedation if you received it and also thins your blood and may cause you to bleed. *WAIT 24 hours before starting back ANY Blood thinning medications:   (Heparin, Coumadin, Warfarin, Lovenox, Plavix, Aggrenox)    *Resume Pre-Procedure Medications as prescribed except Blood Thinners unless directed by your Physician or Cardiologist.     *Avoid Hot tubs and Heating pad for 24 hours to prevent dissipation of medications, you may shower to remove bandages and remaining prep residue on the skin. * If you develop a Headache, drink plenty of fluids including beverages with caffeine (Coffee, Mt. Dew etc.) and rest.  If the headache persists longer than 24 hoursor intensifies - Please call [facility] ([facility]) [phone]    * If you are DIABETIC, check your blood sugar three times a day for the next three days, the steroids will increase your blood sugar. If your blood sugar is greater than 400 have someone drive you to the nearest [address]. * If you experience any of the following problems, call the [facility] [phone] between 8:00 am - 4:30pm or After Hours [phone].     Shortness of breath    Fever of 101 F or higher    Nausea / Vomiting (not normal to you)    Increasing stiffness in the neck    Weakness or numbness in the arms or legs that is not resolving    Prolonged and increasing pain > than 4 days    ANYTHING OUT of the ORDINARY TO YOU    If YOU are experiencing a severe reaction / complication that you have never had before post procedure, call 911 or go to the nearest emergency room! All patients must have a  for transportation [place] regardless if you do or do not receive sedation. DISCHARGE SUMMARY from Nurse      PATIENT INSTRUCTIONS:    After Oral  or intravenous sedation, for 24 hours or while taking prescription Narcotics:  · Limit your activities  · Do not drive and operate hazardous machinery  · Do not make important personal or business decisions  · Do  not drink alcoholic beverages  · If you have not urinated within 8 hours after discharge, please contact your surgeon on call. Report the following to your surgeon:  · Excessive pain, swelling, redness or odor of or around the surgical area  · Temperature over 101  · Nausea and vomiting lasting longer than 4 hours or if unable to take medications  · Any signs of decreased circulation or nerve impairment to extremity: change in color, persistent  numbness, tingling, coldness or increase pain  · Any questions        What to do at Home:  Recommended activity: Activity as tolerated, NO DRIVING FOR 24 Hours post injection          *  Please give a list of your current medications to your Primary Care Provider. *  Please update this list whenever your medications are discontinued, doses are      changed, or new medications (including over-the-counter products) are added. *  Please carry medication information at all times in case of emergency situations. These are general instructions for a healthy lifestyle:    No smoking/ No tobacco products/ Avoid exposure to second hand smoke    Surgeon General's Warning:  Quitting smoking now greatly reduces serious risk to your health.     Obesity, smoking, and sedentary lifestyle greatly increases your risk for illness    A healthy diet, regular physical exercise & weight monitoring are important for maintaining a healthy lifestyle    You may be retaining fluid if you have a history of heart failure or if you experience any of the following symptoms:  Weight gain of 3 pounds or more overnight or 5 pounds in a week, increased swelling in our hands or feet or shortness of breath while lying flat in bed. Please call your doctor as soon as you notice any of these symptoms; do not wait until your next office visit. Recognize signs and symptoms of STROKE:    F-face looks uneven    A-arms unable to move or move unevenly    S-speech slurred or non-existent    T-time-call 911 as soon as signs and symptoms begin-DO NOT go       Back to bed or wait to see if you get better-TIME IS BRAIN.

## 2017-12-04 ENCOUNTER — OFFICE VISIT (OUTPATIENT)
Dept: PAIN MANAGEMENT | Age: 37
End: 2017-12-04

## 2017-12-04 ENCOUNTER — TELEPHONE (OUTPATIENT)
Dept: PAIN MANAGEMENT | Age: 37
End: 2017-12-04

## 2017-12-04 VITALS
TEMPERATURE: 97.8 F | BODY MASS INDEX: 25.9 KG/M2 | SYSTOLIC BLOOD PRESSURE: 141 MMHG | HEART RATE: 67 BPM | RESPIRATION RATE: 14 BRPM | HEIGHT: 71 IN | DIASTOLIC BLOOD PRESSURE: 87 MMHG | WEIGHT: 185 LBS

## 2017-12-04 DIAGNOSIS — M47.812 SPONDYLOSIS OF CERVICAL REGION WITHOUT MYELOPATHY OR RADICULOPATHY: ICD-10-CM

## 2017-12-04 DIAGNOSIS — M54.12 CERVICAL RADICULOPATHY: ICD-10-CM

## 2017-12-04 DIAGNOSIS — M51.36 DEGENERATIVE DISC DISEASE, LUMBAR: ICD-10-CM

## 2017-12-04 DIAGNOSIS — Z86.59 HISTORY OF DEPRESSION: ICD-10-CM

## 2017-12-04 DIAGNOSIS — M54.2 NECK PAIN: ICD-10-CM

## 2017-12-04 DIAGNOSIS — M47.816 SPONDYLOSIS OF LUMBAR REGION WITHOUT MYELOPATHY OR RADICULOPATHY: ICD-10-CM

## 2017-12-04 DIAGNOSIS — M50.30 DDD (DEGENERATIVE DISC DISEASE), CERVICAL: ICD-10-CM

## 2017-12-04 DIAGNOSIS — F43.10 PTSD (POST-TRAUMATIC STRESS DISORDER): ICD-10-CM

## 2017-12-04 DIAGNOSIS — G89.29 CHRONIC MIDLINE LOW BACK PAIN WITH RIGHT-SIDED SCIATICA: Primary | ICD-10-CM

## 2017-12-04 DIAGNOSIS — M54.16 LUMBAR RADICULITIS: ICD-10-CM

## 2017-12-04 DIAGNOSIS — M47.816 DEGENERATIVE JOINT DISEASE OF CERVICAL AND LUMBAR SPINE: ICD-10-CM

## 2017-12-04 DIAGNOSIS — M47.812 DEGENERATIVE JOINT DISEASE OF CERVICAL AND LUMBAR SPINE: ICD-10-CM

## 2017-12-04 DIAGNOSIS — G89.4 CHRONIC PAIN SYNDROME: ICD-10-CM

## 2017-12-04 DIAGNOSIS — M54.41 CHRONIC MIDLINE LOW BACK PAIN WITH RIGHT-SIDED SCIATICA: Primary | ICD-10-CM

## 2017-12-04 DIAGNOSIS — M51.36 LUMBAR DEGENERATIVE DISC DISEASE: ICD-10-CM

## 2017-12-04 NOTE — TELEPHONE ENCOUNTER
Mr. Lisa Dang was contacted for follow-up status post Interlaminar cervical epidural steroid injection, C7-T1  on November 27, 2017.  He reports:    Pre-procedure numerical pain score: 10/10  Post-procedure numerical pain score immediately after: 6/10  Duration of relief post-procedure (if applicable): 1 weeks  Improvement in functional activities (if applicable): Yes  Percentage of overall improvement: 40%    COMMENTS:

## 2017-12-04 NOTE — PROGRESS NOTES
HISTORY OF PRESENT ILLNESS  Cathi Capps is a 30 y.o. male. HPI Comments: Visit survey reviewed  Chief complaint- chronic pain syndrome- low back pain, neck pain  He continues to use Lyrica, Ambien, Zanaflex, tramadol, Prozac, Celebrex from his primary care physician  I had previously prescribed diazepam 2 mg 3 times a day as needed. He reports he is rarely using this medication. He states he does not need a new prescription for this medication as he is trying to move so that he is no longer using it. He reports injections from Dr. Jan Lima for his low back and neck pain. He reports improvement. He will follow-up with Dr. Jan Lima. He is also looking into working with a chiropractor and we discussed this today. He is considering a possible spinal cord stimulator trial and will discuss this further with Dr. Jan Lima.    No new significant side effects reported  Medication continues to help improve quality of life. Medications reviewed including risk and benefits. Recent average level of pain-7    Measurement of clinical outcome for chronic pain patient/ SPAASMS Score Card-            Information reviewed and will be scanned. Total score today-5      Review of Systems   Constitutional: Positive for chills, malaise/fatigue and weight loss. HENT: Negative for hearing loss. Eyes: Negative for pain and discharge. Respiratory: Negative for cough and shortness of breath. Cardiovascular: Negative for claudication and leg swelling. Gastrointestinal: Positive for diarrhea and heartburn. Genitourinary: Negative for dysuria and urgency. Musculoskeletal: Positive for back pain and joint pain. Skin: Negative for itching and rash. Neurological: Positive for headaches. Negative for dizziness and seizures. Endo/Heme/Allergies: Positive for environmental allergies. Does not bruise/bleed easily. Psychiatric/Behavioral: Negative for substance abuse and suicidal ideas. The patient has insomnia.         Physical Exam   Constitutional: He appears well-developed and well-nourished. He is cooperative. He does not have a sickly appearance. HENT:   Head: Normocephalic and atraumatic. Right Ear: External ear normal. No drainage. Left Ear: External ear normal. No drainage. Nose: Nose normal.   Eyes: Lids are normal. Right eye exhibits no discharge. Left eye exhibits no discharge. Right conjunctiva has no hemorrhage. Left conjunctiva has no hemorrhage. Neck: Neck supple. No tracheal deviation present. No thyroid mass present. Pulmonary/Chest: Effort normal. No respiratory distress. Neurological: He is alert. No cranial nerve deficit. Skin: Skin is intact. No rash noted. Psychiatric: His speech is normal. His affect is not angry. He does not express inappropriate judgment. Nursing note and vitals reviewed. ASSESSMENT and PLAN  Encounter Diagnoses   Name Primary?  Chronic midline low back pain with right-sided sciatica Yes    Chronic pain syndrome     PTSD (post-traumatic stress disorder)     Neck pain     History of depression     DDD (degenerative disc disease), cervical     Degenerative disc disease, lumbar     Degenerative joint disease of cervical and lumbar spine     Spondylosis of lumbar region without myelopathy or radiculopathy     Lumbar degenerative disc disease     Lumbar radiculitis     Cervical radiculopathy     Spondylosis of cervical region without myelopathy or radiculopathy    No indicators for recent medication misuse.  reviewed. Pain Meds and Quality Of Life have been reviewed. Nonpharmacologic therapy and non-opioid pharmacologic therapy were considered. If opioid therapy is prescribed, this is only if the expected benefits are anticipated to outweigh risks. Possible changes to treatment plan considered. Support/education given as needed. Today-medications are as listed. No significant changes to medications.   Follow up --as needed, we are not currently prescribing any medication for the patient. He does plan on continuing follow-up and injections in the future with Dr. Meet Hirsch.  I explained to the patient that if he would like to come back we can certainly see him but for right now I do not believe he needs to maintain a specific follow-up.

## 2017-12-04 NOTE — MR AVS SNAPSHOT
Visit Information Date & Time Provider Department Dept. Phone Encounter #  
 12/4/2017  8:15 AM Srini Evans MD [facility] [address] for Pain Management [id number] Upcoming Health Maintenance Date Due DTaP/Tdap/Td series (1 - Tdap) 9/28/2003 Influenza Age 5 to Adult 8/1/2017 Allergies as of 12/4/2017  Review Complete On: 12/4/2017 By: Srini Evans MD  
 No Known Allergies Current Immunizations  Never Reviewed No immunizations on file. Not reviewed this visit You Were Diagnosed With   
  
 Codes Comments Chronic midline low back pain with right-sided sciatica    -  Primary ICD-10-CM: M54.41, G89.29 ICD-9-CM: 724.2, 724.3, 338.29 Chronic pain syndrome     ICD-10-CM: G89.4 ICD-9-CM: 338.4 PTSD (post-traumatic stress disorder)     ICD-10-CM: F43.10 ICD-9-CM: 309.81 Neck pain     ICD-10-CM: M54.2 ICD-9-CM: 723.1 History of depression     ICD-10-CM: Z86.59 
ICD-9-CM: V11.8 DDD (degenerative disc disease), cervical     ICD-10-CM: M50.30 ICD-9-CM: 722.4 Degenerative disc disease, lumbar     ICD-10-CM: M51.36 
ICD-9-CM: 722.52 Degenerative joint disease of cervical and lumbar spine     ICD-10-CM: M47.812, M47.816 ICD-9-CM: 721.0, 721.3 Spondylosis of lumbar region without myelopathy or radiculopathy     ICD-10-CM: M47.816 ICD-9-CM: 721.3 Lumbar degenerative disc disease     ICD-10-CM: M51.36 
ICD-9-CM: 722.52 Lumbar radiculitis     ICD-10-CM: M54.16 
ICD-9-CM: 724.4 Cervical radiculopathy     ICD-10-CM: M54.12 
ICD-9-CM: 723.4 Spondylosis of cervical region without myelopathy or radiculopathy     ICD-10-CM: M47.812 ICD-9-CM: 721.0 Vitals BP Pulse Temp Resp Height(growth percentile) Weight(growth percentile) 141/87 (BP 1 Location: Left arm, BP Patient Position: Sitting) 67 97.8 °F (36.6 °C) (Oral) 14 5' 11\" (1.803 m) 185 lb (83.9 kg) BMI Smoking Status 25.8 kg/m2 Never Smoker Vitals History BMI and BSA Data Body Mass Index Body Surface Area  
 25.8 kg/m 2 2.05 m 2 Your Updated Medication List  
  
   
This list is accurate as of: 12/4/17  8:48 AM.  Always use your most recent med list.  
  
  
  
  
 CeleBREX 200 mg capsule Generic drug:  celecoxib Take  by mouth two (2) times a day. cetirizine 10 mg tablet Commonly known as:  ZYRTEC Take  by mouth. diazePAM 2 mg tablet Commonly known as:  VALIUM Take 1 Tab by mouth three (3) times daily as needed for Anxiety. Max Daily Amount: 6 mg. FLUoxetine 20 mg tablet Commonly known as:  PROzac Take 20 mg by mouth daily. losartan-hydroCHLOROthiazide 50-12.5 mg per tablet Commonly known as:  HYZAAR Take 1 Tab by mouth daily. LYRICA 50 mg capsule Generic drug:  pregabalin Take 50 mg by mouth four (4) times daily. traMADol 50 mg tablet Commonly known as:  ULTRAM  
Take 50 mg by mouth every eight (8) hours as needed for Pain.  
  
 zolpidem 10 mg tablet Commonly known as:  AMBIEN Take 12.5 mg by mouth nightly as needed for Sleep. Introducing [facility]! Dear Valeria Chavarria: Thank you for requesting a [employer] account. Our records indicate that you already have an active [employer] account. You can access your account anytime at [email or web address] Did you know that you can access your hospital and ER discharge instructions at any time in [employer]? You can also review all of your test results from your hospital stay or ER visit. Additional Information If you have questions, please visit the Frequently Asked Questions section of the [employer] website at [email or web address] Remember, [employer] is NOT to be used for urgent needs. For medical emergencies, dial 911. Now available from your iPhone and Android! Please provide this summary of care documentation to your next provider. Your primary care clinician is listed as Phys Other. If you have any questions after today's visit, please call [phone].

## 2017-12-04 NOTE — PROGRESS NOTES
Nursing Notes    Patient presents to the office today in follow-up. Patient rates his pain at 7/10 on the numerical pain scale. Reviewed medications with counts as follows:    Rx Date filled Qty Dispensed Pill Count Last Dose Short   Valium 2 mg - 1 refill on the bottle  07/11/17 90 90 2 weeks ago no                                   POC UDS was not performed in office today    Any new labs or imaging since last appointment? NO    Have you been to an emergency room (ER) or urgent care clinic since your last visit? NO            Have you been hospitalized since your last visit? NO     If yes, where, when, and reason for visit? Have you seen or consulted any other health care providers outside of the [address] [place] [address]  since your last visit? YES     If yes, where, when, and reason for visit? pcp    HM deferred to pcp.

## 2018-01-11 ENCOUNTER — TELEPHONE (OUTPATIENT)
Dept: PAIN MANAGEMENT | Age: 38
End: 2018-01-11

## 2018-01-11 NOTE — TELEPHONE ENCOUNTER
Patient called the triage line asking for the surgeon that MD [facility] told him about. I found out the patient was referring to MD kathleen. I found the number and called the patient and gave the number to the patient. The patient is now able to call and make an appt with that office.

## 2018-03-09 ENCOUNTER — TELEPHONE (OUTPATIENT)
Dept: PAIN MANAGEMENT | Age: 38
End: 2018-03-09

## 2018-03-09 NOTE — TELEPHONE ENCOUNTER
Last contact with patient, he was rotating between cerivical and lumbar epidurals. Called pt to inform his Love Rainer will soon  and find out how he would like to proceed. 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 He states the last cervical injection did not help all that much, and he was hoping to talk to Dr. Cassie Adkins about his disc bulges to see if he needed surgery in that regard. Overall at this point he was looking more for a neurologist and is working with the [employer] to address his new diagnoses for now. I let the pt know that his Lovekenneth Medinaer does  on 18. Pt states when he is done being worked up for other issues he may have his PCM submit a new auth and will call us for Dr. Jennie Cain or call us to request a consult with Dr. Cassie Adkins. I will shred his orders for now, he will need to re-consult with Kemal and we need his more recent records. Pt thanks us for the f/u call and will be in touch.

## 2018-08-11 ENCOUNTER — HOSPITAL ENCOUNTER (OUTPATIENT)
Dept: MRI IMAGING | Age: 38
Discharge: HOME | End: 2018-08-11
Attending: PSYCHIATRY & NEUROLOGY
Payer: OTHER GOVERNMENT

## 2018-08-11 DIAGNOSIS — M54.17 LUMBOSACRAL NEURITIS: ICD-10-CM

## 2018-08-11 PROCEDURE — 72148 MRI LUMBAR SPINE W/O DYE: CPT

## 2018-08-17 ENCOUNTER — HOSPITAL ENCOUNTER (OUTPATIENT)
Dept: MRI IMAGING | Age: 38
Discharge: HOME | End: 2018-08-17
Attending: PSYCHIATRY & NEUROLOGY
Payer: OTHER GOVERNMENT

## 2018-08-17 DIAGNOSIS — G35 MULTIPLE SCLEROSIS (HCC): ICD-10-CM

## 2018-08-17 PROCEDURE — 70551 MRI BRAIN STEM W/O DYE: CPT

## 2018-08-20 ENCOUNTER — HOSPITAL ENCOUNTER (EMERGENCY)
Age: 38
Discharge: HOME | End: 2018-08-20
Attending: EMERGENCY MEDICINE
Payer: OTHER GOVERNMENT

## 2018-08-20 VITALS
RESPIRATION RATE: 20 BRPM | DIASTOLIC BLOOD PRESSURE: 87 MMHG | SYSTOLIC BLOOD PRESSURE: 130 MMHG | HEART RATE: 69 BPM | WEIGHT: 190 LBS | HEIGHT: 71 IN | OXYGEN SATURATION: 96 % | BODY MASS INDEX: 26.6 KG/M2 | TEMPERATURE: 97.9 F

## 2018-08-20 DIAGNOSIS — G89.29 CHRONIC RIGHT-SIDED LOW BACK PAIN WITH RIGHT-SIDED SCIATICA: Primary | ICD-10-CM

## 2018-08-20 DIAGNOSIS — M54.41 CHRONIC RIGHT-SIDED LOW BACK PAIN WITH RIGHT-SIDED SCIATICA: Primary | ICD-10-CM

## 2018-08-20 PROCEDURE — 74011250637 HC RX REV CODE- 250/637: Performed by: PHYSICIAN ASSISTANT

## 2018-08-20 PROCEDURE — 74011636637 HC RX REV CODE- 636/637: Performed by: PHYSICIAN ASSISTANT

## 2018-08-20 PROCEDURE — 99283 EMERGENCY DEPT VISIT LOW MDM: CPT

## 2018-08-20 RX ORDER — NALOXONE HYDROCHLORIDE 4 MG/.1ML
SPRAY NASAL
Qty: 1 EACH | Refills: 0 | Status: SHIPPED | OUTPATIENT
Start: 2018-08-20

## 2018-08-20 RX ORDER — PREDNISONE 50 MG/1
50 TABLET ORAL DAILY
Qty: 5 TAB | Refills: 0 | Status: SHIPPED | OUTPATIENT
Start: 2018-08-20 | End: 2018-08-25

## 2018-08-20 RX ORDER — DIAZEPAM 5 MG/1
5 TABLET ORAL
Qty: 20 TAB | Refills: 0 | Status: SHIPPED | OUTPATIENT
Start: 2018-08-20

## 2018-08-20 RX ORDER — PREDNISONE 20 MG/1
60 TABLET ORAL
Status: COMPLETED | OUTPATIENT
Start: 2018-08-20 | End: 2018-08-20

## 2018-08-20 RX ORDER — NAPROXEN 500 MG/1
500 TABLET ORAL 2 TIMES DAILY WITH MEALS
Qty: 20 TAB | Refills: 0 | Status: SHIPPED | OUTPATIENT
Start: 2018-08-20

## 2018-08-20 RX ORDER — DIAZEPAM 5 MG/1
10 TABLET ORAL
Status: COMPLETED | OUTPATIENT
Start: 2018-08-20 | End: 2018-08-20

## 2018-08-20 RX ADMIN — PREDNISONE 60 MG: 20 TABLET ORAL at 11:15

## 2018-08-20 RX ADMIN — DIAZEPAM 10 MG: 5 TABLET ORAL at 11:15

## 2018-08-20 NOTE — DISCHARGE INSTRUCTIONS
Learning About How to Have a Healthy Back  What causes back pain? Back pain is often caused by overuse, strain, or injury. For example, people often hurt their backs playing sports or working in the yard, being jolted in a car accident, or lifting something too heavy. Aging plays a part too. Your bones and muscles tend to lose strength as you age, which makes injury more likely. The spongy discs between the bones of the spine (vertebrae) may suffer from wear and tear and no longer provide enough cushion between the bones. A disc that bulges or breaks open (herniated disc) can press on nerves, causing back pain. In some people, back pain is the result of arthritis, broken vertebrae caused by bone loss (osteoporosis), illness, or a spine problem. Although most people have back pain at one time or another, there are steps you can take to make it less likely. How can you have a healthy back? Reduce stress on your back through good posture  Slumping or slouching alone may not cause low back pain. But after the back has been strained or injured, bad posture can make pain worse. · Sleep in a position that maintains your back's normal curves and on a mattress that feels comfortable. Sleep on your side with a pillow between your knees, or sleep on your back with a pillow under your knees. These positions can reduce strain on your back. · Stand and sit up straight. \"Good posture\" generally means your ears, shoulders, and hips are in a straight line. · If you must stand for a long time, put one foot on a stool, ledge, or box. Switch feet every now and then. · Sit in a chair that is low enough to let you place both feet flat on the floor with both knees nearly level with your hips. If your chair or desk is too high, use a footrest to raise your knees. Place a small pillow, a rolled-up towel, or a lumbar roll in the curve of your back if you need extra support.   · Try a kneeling chair, which helps tilt your hips forward. This takes pressure off your lower back. · Try sitting on an exercise ball. It can rock from side to side, which helps keep your back loose. · When driving, keep your knees nearly level with your hips. Sit straight, and drive with both hands on the steering wheel. Your arms should be in a slightly bent position. Reduce stress on your back through careful lifting  · Squat down, bending at the hips and knees only. If you need to, put one knee to the floor and extend your other knee in front of you, bent at a right angle (half kneeling). · Press your chest straight forward. This helps keep your upper back straight while keeping a slight arch in your low back. · Hold the load as close to your body as possible, at the level of your belly button (navel). · Use your feet to change direction, taking small steps. · Lead with your hips as you change direction. Keep your shoulders in line with your hips as you move. · Set down your load carefully, squatting with your knees and hips only. Exercise and stretch your back  · Do some exercise on most days of the week, if your doctor says it is okay. You can walk, run, swim, or cycle. · Stretch your back muscles. Here are a few exercises to try:  Hernesto Stubbs on your back, and gently pull one bent knee to your chest. Put that foot back on the floor, and then pull the other knee to your chest.  ¨ Do pelvic tilts. Lie on your back with your knees bent. Tighten your stomach muscles. Pull your belly button (navel) in and up toward your ribs. You should feel like your back is pressing to the floor and your hips and pelvis are slightly lifting off the floor. Hold for 6 seconds while breathing smoothly. ¨ Sit with your back flat against a wall. · Keep your core muscles strong. The muscles of your back, belly (abdomen), and buttocks support your spine. ¨ Pull in your belly and imagine pulling your navel toward your spine. Hold this for 6 seconds, then relax.  Remember to keep breathing normally as you tense your muscles. ¨ Do curl-ups. Always do them with your knees bent. Keep your low back on the floor, and curl your shoulders toward your knees using a smooth, slow motion. Keep your arms folded across your chest. If this bothers your neck, try putting your hands behind your neck (not your head), with your elbows spread apart. ¨ Lie on your back with your knees bent and your feet flat on the floor. Tighten your belly muscles, and then push with your feet and raise your buttocks up a few inches. Hold this position 6 seconds as you continue to breathe normally, then lower yourself slowly to the floor. Repeat 8 to 12 times. ¨ If you like group exercise, try Pilates or yoga. These classes have poses that strengthen the core muscles. Lead a healthy lifestyle  · Stay at a healthy weight to avoid strain on your back. · Do not smoke. Smoking increases the risk of osteoporosis, which weakens the spine. If you need help quitting, talk to your doctor about stop-smoking programs and medicines. These can increase your chances of quitting for good. Where can you learn more? Go to http://libby-edwin.info/. Enter L315 in the search box to learn more about \"Learning About How to Have a Healthy Back. \"  Current as of: November 29, 2017  Content Version: 11.7  © 5857-2243 [employer]. Care instructions adapted under license by [employer] (which disclaims liability or warranty for this information). If you have questions about a medical condition or this instruction, always ask your healthcare professional. [place] [address] any warranty or liability for your use of this information.

## 2018-08-20 NOTE — ED PROVIDER NOTES
EMERGENCY DEPARTMENT HISTORY AND PHYSICAL EXAM    Date: 8/20/2018  Patient Name: Liyah Springer    History of Presenting Illness     Chief Complaint   Patient presents with    Back Pain     R lower back pain         History Provided By: Patient    Chief Complaint: back pain  Duration: 4 Hours  Timing:  Worsening  Location: right lower back  Quality: Stabbing  Severity: 10 out of 10  Modifying Factors: worse with movement  Associated Symptoms: denies any other associated signs or symptoms      Additional History (Context): Liyah Springer is a 30 y.o. male with hypertension, PTSD who presents with complaint of low back pain. Pt states he has chronic issues with his back but exacerbated it this morning while bending over. Pt rates his pain as a 10/10 and constant. Pain radiates into right buttock. He denies fall or trauma, bowel or bladder incontinence, paresthesias or anesthesia. PCP: Becca Feng MD    Current Outpatient Prescriptions   Medication Sig Dispense Refill    diazePAM (VALIUM) 5 mg tablet Take 1 Tab by mouth every eight (8) hours as needed. Max Daily Amount: 15 mg. Indications: Muscle Spasm 20 Tab 0    predniSONE (DELTASONE) 50 mg tablet Take 1 Tab by mouth daily for 5 days. 5 Tab 0    naproxen (NAPROSYN) 500 mg tablet Take 1 Tab by mouth two (2) times daily (with meals). 20 Tab 0    naloxone (NARCAN) 4 mg/actuation nasal spray Use 1 spray intranasally, then discard. Repeat with new spray every 2 min as needed for opioid overdose symptoms, alternating nostrils. 1 Each 0    pregabalin (LYRICA) 50 mg capsule Take 50 mg by mouth four (4) times daily.  losartan-hydroCHLOROthiazide (HYZAAR) 50-12.5 mg per tablet Take 1 Tab by mouth daily.  FLUoxetine (PROZAC) 20 mg tablet Take 20 mg by mouth daily.  traMADol (ULTRAM) 50 mg tablet Take 50 mg by mouth every eight (8) hours as needed for Pain.  zolpidem (AMBIEN) 10 mg tablet Take 12.5 mg by mouth nightly as needed for Sleep.       cetirizine (ZYRTEC) 10 mg tablet Take  by mouth. Past History     Past Medical History:  Past Medical History:   Diagnosis Date    Depression     Headache     Hypertension     Insomnia     Meningitis     PTSD (post-traumatic stress disorder)        Past Surgical History:  Past Surgical History:   Procedure Laterality Date    HX UROLOGICAL         Family History:  Family History   Problem Relation Age of Onset    Hypertension Mother     Hypertension Father     Heart Disease Father        Social History:  Social History   Substance Use Topics    Smoking status: Never Smoker    Smokeless tobacco: Never Used    Alcohol use No       Allergies:  No Known Allergies      Review of Systems   Review of Systems   Constitutional: Negative for fatigue and fever. HENT: Negative for congestion. Eyes: Negative for pain. Respiratory: Negative for cough and shortness of breath. Cardiovascular: Negative for chest pain. Gastrointestinal: Negative for abdominal pain, diarrhea, nausea and vomiting. Genitourinary: Negative for dysuria and flank pain. Musculoskeletal: Positive for back pain and gait problem. Skin: Negative for wound. Neurological: Negative for dizziness and headaches. Psychiatric/Behavioral: Negative for confusion. All other systems reviewed and are negative. All Other Systems Negative  Physical Exam     Vitals:    08/20/18 1037   BP: 130/87   Pulse: 69   Resp: 20   Temp: 97.9 °F (36.6 °C)   SpO2: 96%   Weight: 86.2 kg (190 lb)   Height: 5' 11\" (1.803 m)     Physical Exam   Constitutional: He is oriented to person, place, and time. He appears well-developed and well-nourished. He appears distressed (mildly). HENT:   Head: Normocephalic and atraumatic. Nose: Nose normal.   Eyes: Conjunctivae are normal. Pupils are equal, round, and reactive to light. Neck: Normal range of motion. Cardiovascular: Normal rate, regular rhythm and normal heart sounds.     Pulmonary/Chest: Effort normal and breath sounds normal. No respiratory distress. He has no wheezes. Musculoskeletal:        Right hip: He exhibits decreased range of motion and decreased strength. Lumbar back: He exhibits decreased range of motion, tenderness, pain and spasm. He exhibits no bony tenderness, no swelling, no edema, no deformity and no laceration. Back:    Pt unable to perform ROM and Seated leg lift. States unable to get out of chair or even lean forward for palpation. Neurological: He is alert and oriented to person, place, and time. GCS eye subscore is 4. GCS verbal subscore is 5. GCS motor subscore is 6. Reflex Scores:       Achilles reflexes are 2+ on the right side and 2+ on the left side. Pt unwilling/able to complete full ROM, states unable to get out of chair for eval.   Skin: Skin is warm and dry. Psychiatric: He has a normal mood and affect. His behavior is normal.   Vitals reviewed. Diagnostic Study Results     Labs -   No results found for this or any previous visit (from the past 12 hour(s)). Radiologic Studies -   No orders to display     CT Results  (Last 48 hours)    None        CXR Results  (Last 48 hours)    None            Medical Decision Making   I am the first provider for this patient. I reviewed the vital signs, available nursing notes, past medical history, past surgical history, family history and social history. Vital Signs-Reviewed the patient's vital signs. Records Reviewed: Nursing Notes and Old Medical Records    Procedures:  Procedures    Provider Notes (Medical Decision Making):     ON re evaluation after medication administration pt states some improvement in pain. Pt is able to preform limited AROM and seated leg lift is positive for sciatic involvement. Pt states he has been dealing with this pain for about 2 years and seeks referrals outside [facility] [facility].   PT is ambulatory to restroom following exam.  Will discharge home with ortho and Neuro follow up. MED RECONCILIATION:  No current facility-administered medications for this encounter. Current Outpatient Prescriptions   Medication Sig    diazePAM (VALIUM) 5 mg tablet Take 1 Tab by mouth every eight (8) hours as needed. Max Daily Amount: 15 mg. Indications: Muscle Spasm    predniSONE (DELTASONE) 50 mg tablet Take 1 Tab by mouth daily for 5 days.  naproxen (NAPROSYN) 500 mg tablet Take 1 Tab by mouth two (2) times daily (with meals).  naloxone (NARCAN) 4 mg/actuation nasal spray Use 1 spray intranasally, then discard. Repeat with new spray every 2 min as needed for opioid overdose symptoms, alternating nostrils.  pregabalin (LYRICA) 50 mg capsule Take 50 mg by mouth four (4) times daily.  losartan-hydroCHLOROthiazide (HYZAAR) 50-12.5 mg per tablet Take 1 Tab by mouth daily.  FLUoxetine (PROZAC) 20 mg tablet Take 20 mg by mouth daily.  traMADol (ULTRAM) 50 mg tablet Take 50 mg by mouth every eight (8) hours as needed for Pain.  zolpidem (AMBIEN) 10 mg tablet Take 12.5 mg by mouth nightly as needed for Sleep.  cetirizine (ZYRTEC) 10 mg tablet Take  by mouth. Disposition:  discharge    DISCHARGE NOTE:     Pt has been reexamined. Patient has no new complaints, changes, or physical findings. Care plan outlined and precautions discussed. Results of visit were reviewed with the patient. All medications were reviewed with the patient; will d/c home with muscle relaxants and anti inflammatory medications All of pt's questions and concerns were addressed. Patient was instructed and agrees to follow up with Ortho/neuro, as well as to return to the ED upon further deterioration. Patient is ready to go home.     Follow-up Information     Follow up With Details Comments Contact Info    Phys Other, MD Call follow up Patient can only remember the practice name and not the physician      [address] Call As needed, follow up Ronnie [facility] [phone] [id number] [facility]    Aide Brewer MD Call follow up Dolores Harry [address]  [address] [facility]  [phone]            Discharge Medication List as of 8/20/2018 12:29 PM      START taking these medications    Details   predniSONE (DELTASONE) 50 mg tablet Take 1 Tab by mouth daily for 5 days. , Print, Disp-5 Tab, R-0      naproxen (NAPROSYN) 500 mg tablet Take 1 Tab by mouth two (2) times daily (with meals). , Print, Disp-20 Tab, R-0      naloxone (NARCAN) 4 mg/actuation nasal spray Use 1 spray intranasally, then discard. Repeat with new spray every 2 min as needed for opioid overdose symptoms, alternating nostrils. , Print, Disp-1 Each, R-0         CONTINUE these medications which have CHANGED    Details   diazePAM (VALIUM) 5 mg tablet Take 1 Tab by mouth every eight (8) hours as needed. Max Daily Amount: 15 mg. Indications: Muscle Spasm, Print, Disp-20 Tab, R-0         CONTINUE these medications which have NOT CHANGED    Details   pregabalin (LYRICA) 50 mg capsule Take 50 mg by mouth four (4) times daily. , Historical Med      losartan-hydroCHLOROthiazide (HYZAAR) 50-12.5 mg per tablet Take 1 Tab by mouth daily. , Historical Med      FLUoxetine (PROZAC) 20 mg tablet Take 20 mg by mouth daily. , Historical Med      traMADol (ULTRAM) 50 mg tablet Take 50 mg by mouth every eight (8) hours as needed for Pain., Historical Med      zolpidem (AMBIEN) 10 mg tablet Take 12.5 mg by mouth nightly as needed for Sleep., Historical Med      cetirizine (ZYRTEC) 10 mg tablet Take  by mouth., Historical Med         STOP taking these medications       celecoxib (CELEBREX) 200 mg capsule Comments:   Reason for Stopping:             Diagnosis     Clinical Impression:   1.  Chronic right-sided low back pain with right-sided sciatica

## 2018-08-20 NOTE — LETTER
NOTIFICATION RETURN TO WORK / SCHOOL 
 
8/20/2018 12:29 PM 
 
Mr. Tila Murrell 
[address] [place] [address] To Whom It May Concern: 
 
Tila Murrell is currently under the care of [facility]. He will return to work/school on: 8/23/18 If there are questions or concerns please have the patient contact our office.  
 
 
 
Sincerely, 
 
 
ROSAURA Maddox

## 2018-08-20 NOTE — ED TRIAGE NOTES
Pt reports R lower  Back pain since this am. \" It's hard for me to move. I have back problems. I don't know if I move the wrong way\".

## 2019-03-13 ENCOUNTER — HOSPITAL ENCOUNTER (OUTPATIENT)
Dept: MRI IMAGING | Age: 39
Discharge: HOME | End: 2019-03-13
Attending: NURSE PRACTITIONER
Payer: OTHER GOVERNMENT

## 2019-03-13 DIAGNOSIS — M54.9 DORSALGIA: ICD-10-CM

## 2019-03-13 PROCEDURE — 72148 MRI LUMBAR SPINE W/O DYE: CPT

## 2019-03-13 PROCEDURE — 72141 MRI NECK SPINE W/O DYE: CPT

## 2022-03-18 PROBLEM — M47.812 DEGENERATIVE JOINT DISEASE OF CERVICAL AND LUMBAR SPINE: Status: ACTIVE | Noted: 2017-06-07

## 2022-03-18 PROBLEM — M47.816 DEGENERATIVE JOINT DISEASE OF CERVICAL AND LUMBAR SPINE: Status: ACTIVE | Noted: 2017-06-07

## 2022-03-18 PROBLEM — M51.369 LUMBAR DEGENERATIVE DISC DISEASE: Status: ACTIVE | Noted: 2017-08-29

## 2022-03-19 PROBLEM — M47.816 SPONDYLOSIS OF LUMBAR REGION WITHOUT MYELOPATHY OR RADICULOPATHY: Status: ACTIVE | Noted: 2017-08-29

## 2022-03-19 PROBLEM — M54.2 NECK PAIN: Status: ACTIVE | Noted: 2017-06-07

## 2022-03-19 PROBLEM — G89.4 CHRONIC PAIN SYNDROME: Status: ACTIVE | Noted: 2017-06-07

## 2022-03-19 PROBLEM — M54.16 LUMBAR RADICULITIS: Status: ACTIVE | Noted: 2017-08-29

## 2022-03-19 PROBLEM — M47.812 SPONDYLOSIS OF CERVICAL REGION WITHOUT MYELOPATHY OR RADICULOPATHY: Status: ACTIVE | Noted: 2017-10-19

## 2022-03-19 PROBLEM — M50.30 DDD (DEGENERATIVE DISC DISEASE), CERVICAL: Status: ACTIVE | Noted: 2017-06-07

## 2022-03-19 PROBLEM — Z79.899 ENCOUNTER FOR LONG-TERM (CURRENT) USE OF MEDICATIONS: Status: ACTIVE | Noted: 2017-06-07

## 2022-03-20 PROBLEM — M51.369 DEGENERATIVE DISC DISEASE, LUMBAR: Status: ACTIVE | Noted: 2017-06-07

## 2022-03-20 PROBLEM — G89.29 CHRONIC MIDLINE LOW BACK PAIN WITH RIGHT-SIDED SCIATICA: Status: ACTIVE | Noted: 2017-06-07

## 2022-03-20 PROBLEM — Z86.59 HISTORY OF DEPRESSION: Status: ACTIVE | Noted: 2017-06-07

## 2022-03-20 PROBLEM — M54.41 CHRONIC MIDLINE LOW BACK PAIN WITH RIGHT-SIDED SCIATICA: Status: ACTIVE | Noted: 2017-06-07

## 2022-03-20 PROBLEM — F43.10 PTSD (POST-TRAUMATIC STRESS DISORDER): Status: ACTIVE | Noted: 2017-06-07

## 2022-03-20 PROBLEM — M54.12 CERVICAL RADICULOPATHY: Status: ACTIVE | Noted: 2017-10-19

## 2024-05-21 NOTE — PROGRESS NOTES
Mr. Lani Rose attended the Education Class facilitated by Salome Santiago LPN. Objectives of this class are to review practice policies, protocols and the Controlled Substances Consent and Treatment Agreement, discuss \"what if\" scenarios, introduce staff, and provide an opportunity for questions and answers. Ultimately, goals for this class are for Mr. Lani Rose to:    · Be educated - Learn as much as possible about his pain and related treatment, our policies and the Controlled Substances Agreement. · Be responsible - Follow the providers advice regarding treatment recommendations, medications, and prescription information. · Be confident - Better manage his pain and return to a more functional lifestyle. At least 45 minutes was spent with the patient in face-to-face contact today.
No

## 2025-01-22 ENCOUNTER — HOSPITAL ENCOUNTER (OUTPATIENT)
Facility: HOSPITAL | Age: 45
Setting detail: RECURRING SERIES
Discharge: HOME | End: 2025-01-25
Payer: OTHER GOVERNMENT

## 2025-01-22 PROCEDURE — 97161 PT EVAL LOW COMPLEX 20 MIN: CPT

## 2025-01-22 PROCEDURE — 97110 THERAPEUTIC EXERCISES: CPT

## 2025-01-22 PROCEDURE — 97535 SELF CARE MNGMENT TRAINING: CPT
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
## 2025-01-22 NOTE — PROGRESS NOTES
PHYSICAL / OCCUPATIONAL THERAPY - DAILY TREATMENT NOTE (updated )    Patient Name: Ronak Adams    Date: 2025    : 1980  Insurance: Payor: [employer] / Plan: [employer]UM / Product Type: *No Product type* /      Patient  verified Yes     Visit #   Current / Total 1 10   Time   In / Out 2:57 3:42   Pain   In / Out 9 9   Subjective Functional Status/Changes: See POC     TREATMENT AREA =  Neck pain [M54.2]  Pain in left shoulder [M25.512]    OBJECTIVE    19 min   Eval - untimed                      Therapeutic Procedures:  Tx Min Billable or 1:1 Min (if diff from Tx Min) Procedure, Rationale, Specifics   12  93368 Therapeutic Exercise (timed):  increase ROM, strength, coordination, balance, and proprioception to improve patient's ability to progress to PLOF and address remaining functional goals. (see flow sheet as applicable)     Details if applicable:  HEP instruction and demonstration     12  83191 Self Care/Home Management (timed):  improve patient knowledge and understanding of home injury/symptom/pain management, positioning, posture/ergonomics, home safety, activity modification, transfer techniques, and joint protection strategies  to improve patient's ability to progress to PLOF and address remaining functional goals.  (see flow sheet as applicable)     Details if applicable:  pt education on relevant anatomy/physiology   2  12858 Manual Therapy (timed):  decrease pain, increase ROM, increase tissue extensibility, and increase postural awareness to improve patient's ability to progress to PLOF and address remaining functional goals.  The manual therapy interventions were performed at a separate and distinct time from the therapeutic activities interventions . (see flow sheet as applicable)     Details if applicable:  In supine: MET to correct left elevated first rib.     26  [facility] Totals Reminder: bill using total billable min of TIMED therapeutic procedures (example: do not include dry

## 2025-01-30 ENCOUNTER — HOSPITAL ENCOUNTER (OUTPATIENT)
Facility: HOSPITAL | Age: 45
Setting detail: RECURRING SERIES
End: 2025-01-30
Payer: OTHER GOVERNMENT

## 2025-01-30 PROCEDURE — 97110 THERAPEUTIC EXERCISES: CPT

## 2025-01-30 PROCEDURE — 97535 SELF CARE MNGMENT TRAINING: CPT

## 2025-01-30 NOTE — PROGRESS NOTES
PHYSICAL / OCCUPATIONAL THERAPY - DAILY TREATMENT NOTE    Patient Name: Ronak Adams    Date: 2025    : 1980  Insurance: Payor: [employer] OPTUM / Plan: [employer]UM / Product Type: *No Product type* /      Patient  verified Yes     Visit #   Current / Total 2 10   Time   In / Out 308 330   Pain   In / Out 8.5 7-7.5   Subjective Functional Status/Changes: Patient reports doing okay.     TREATMENT AREA =  Neck pain [M54.2]  Pain in left shoulder [M25.512]     OBJECTIVE        Therapeutic Procedures:    Tx Min Billable or 1:1 Min (if diff from Tx Min) Procedure, Rationale, Specifics   14 14 81628 Therapeutic Exercise (timed):  increase ROM, strength, coordination, balance, and proprioception to improve patient's ability to progress to PLOF and address remaining functional goals. (see flow sheet as applicable)     Details if applicable:       8 8 [id number] Self Care/Home Management (timed):  improve patient knowledge and understanding of home injury/symptom/pain management, positioning, posture/ergonomics, home safety, activity modification, transfer techniques, and joint protection strategies  to improve patient's ability to progress to PLOF and address remaining functional goals.  (see flow sheet as applicable)     Details if applicable:      22 [facility] Totals Reminder: bill using total billable min of TIMED therapeutic procedures (example: do not include dry needle or estim unattended, both untimed codes, in totals to left)  8-22 min = 1 unit; 23-37 min = 2 units; 38-52 min = 3 units; 53-67 min = 4 units; 68-82 min = 5 units   Total Total     [x]  Patient Education billed concurrently with other procedures   [x] Review HEP    [] Progressed/Changed HEP, detail:    [] Other detail:       Objective Information/Functional Measures/Assessment    Session shortened due to patient late arrival. Initiated session per POC. Therapist provided instruction with vc/demo for all exercises. Patient is extremely limited with

## 2025-02-05 ENCOUNTER — HOSPITAL ENCOUNTER (OUTPATIENT)
Facility: HOSPITAL | Age: 45
Setting detail: RECURRING SERIES
Discharge: HOME | End: 2025-02-08
Payer: OTHER GOVERNMENT

## 2025-02-05 PROCEDURE — 97140 MANUAL THERAPY 1/> REGIONS: CPT

## 2025-02-05 PROCEDURE — 97535 SELF CARE MNGMENT TRAINING: CPT

## 2025-02-05 PROCEDURE — 97112 NEUROMUSCULAR REEDUCATION: CPT

## 2025-02-05 PROCEDURE — 97110 THERAPEUTIC EXERCISES: CPT

## 2025-02-05 NOTE — PROGRESS NOTES
PHYSICAL / OCCUPATIONAL THERAPY - DAILY TREATMENT NOTE    Patient Name: Ronak Adams    Date: 2025    : 1980  Insurance: Payor: [employer] OPTUM / Plan: [employer]UM / Product Type: *No Product type* /      Patient  verified Yes     Visit #   Current / Total 3 10   Time   In / Out 450 530   Pain   In / Out 7.5 6.5-7   Subjective Functional Status/Changes: \"It hurts to turn my head to the left.\"     TREATMENT AREA =  Neck pain [M54.2]  Pain in left shoulder [M25.512]     OBJECTIVE         Therapeutic Procedures:    Tx Min Billable or 1:1 Min (if diff from Tx Min) Procedure, Rationale, Specifics   10  94693 Therapeutic Exercise (timed):  increase ROM, strength, coordination, balance, and proprioception to improve patient's ability to progress to PLOF and address remaining functional goals. (see flow sheet as applicable)     Details if applicable:       10  00559 Neuromuscular Re-Education (timed):  improve balance, coordination, kinesthetic sense, posture, core stability and proprioception to improve patient's ability to develop conscious control of individual muscles and awareness of position of extremities in order to progress to PLOF and address remaining functional goals. (see flow sheet as applicable)     Details if applicable:     10  86997 Manual Therapy (timed):  decrease pain, increase ROM, increase tissue extensibility, decrease trigger points, and increase postural awareness to improve patient's ability to progress to PLOF and address remaining functional goals.  The manual therapy interventions were performed at a separate and distinct time from the therapeutic activities interventions . (see flow sheet as applicable)     Details if applicable:  Grade 3 & 4 PA mobs to upper and lower t/s, grade 2 & 3 left rotation and right side bending mobs to lower cervical, SOR, right posterior rib AP mob     10  47516 Self Care/Home Management (timed):  improve patient knowledge and understanding of home

## 2025-02-10 ENCOUNTER — HOSPITAL ENCOUNTER (OUTPATIENT)
Facility: HOSPITAL | Age: 45
Setting detail: RECURRING SERIES
End: 2025-02-10
Payer: OTHER GOVERNMENT

## 2025-02-12 ENCOUNTER — HOSPITAL ENCOUNTER (OUTPATIENT)
Facility: HOSPITAL | Age: 45
Setting detail: RECURRING SERIES
Discharge: HOME | End: 2025-02-15
Payer: OTHER GOVERNMENT

## 2025-02-12 PROCEDURE — 97535 SELF CARE MNGMENT TRAINING: CPT

## 2025-02-12 PROCEDURE — 97012 MECHANICAL TRACTION THERAPY: CPT

## 2025-02-12 PROCEDURE — 97112 NEUROMUSCULAR REEDUCATION: CPT

## 2025-02-12 PROCEDURE — 97110 THERAPEUTIC EXERCISES: CPT

## 2025-02-12 NOTE — PROGRESS NOTES
PHYSICAL / OCCUPATIONAL THERAPY - DAILY TREATMENT NOTE    Patient Name: Ronak Adams    Date: 2025    : 1980  Insurance: Payor: AW[employer] OPTUM / Plan: [employer] OPTUM / Product Type: *No Product type* /      Patient  verified Yes     Visit #   Current / Total 4 10   Time   In / Out 453 534   Pain   In / Out 7 6   Subjective Functional Status/Changes: Pt reports the pain in his neck is about the same.     TREATMENT AREA =  Neck pain [M54.2]  Pain in left shoulder [M25.512]     OBJECTIVE    Mechanical Traction (UNTIMED):  Supine and Intermittent  type/lbs: cervical, 22/11lbs     Min Rationale   10 decrease pain and increase tissue extensibility to improve patient's ability to progress to PLOF and address remaining functional goals.     Skin assessment post-treatment:   Intact     Therapeutic Procedures:    Tx Min Billable or 1:1 Min (if diff from Tx Min) Procedure, Rationale, Specifics   13  91160 Therapeutic Exercise (timed):  increase ROM, strength, coordination, balance, and proprioception to improve patient's ability to progress to PLOF and address remaining functional goals. (see flow sheet as applicable)     Details if applicable:       10  70869 Neuromuscular Re-Education (timed):  improve balance, coordination, kinesthetic sense, posture, core stability and proprioception to improve patient's ability to develop conscious control of individual muscles and awareness of position of extremities in order to progress to PLOF and address remaining functional goals. (see flow sheet as applicable)     Details if applicable:  scapular/cervical re-ed   8  97604 Self Care/Home Management (timed):  improve patient knowledge and understanding of home injury/symptom/pain management, positioning, posture/ergonomics, home safety, and joint protection strategies  to improve patient's ability to progress to PLOF and address remaining functional goals.  (see flow sheet as applicable)     Details if applicable:  pt ed

## 2025-02-17 ENCOUNTER — HOSPITAL ENCOUNTER (OUTPATIENT)
Facility: HOSPITAL | Age: 45
Setting detail: RECURRING SERIES
Discharge: HOME | End: 2025-02-20
Payer: OTHER GOVERNMENT

## 2025-02-17 PROCEDURE — 97535 SELF CARE MNGMENT TRAINING: CPT

## 2025-02-17 PROCEDURE — 97110 THERAPEUTIC EXERCISES: CPT

## 2025-02-17 PROCEDURE — 97112 NEUROMUSCULAR REEDUCATION: CPT

## 2025-02-17 PROCEDURE — 97012 MECHANICAL TRACTION THERAPY: CPT

## 2025-02-17 NOTE — PROGRESS NOTES
PHYSICAL / OCCUPATIONAL THERAPY - DAILY TREATMENT NOTE    Patient Name: Ronak Adams    Date: 2025    : 1980  Insurance: Payor: [employer] OPTUM / Plan: [employer] OPTUM / Product Type: *No Product type* /      Patient  verified Yes     Visit #   Current / Total 5 10   Time   In / Out 452 532   Pain   In / Out 7 6   Subjective Functional Status/Changes: States that he is stiff, was hurting a lot yesterday     TREATMENT AREA =  Neck pain [M54.2]  Pain in left shoulder [M25.512]     OBJECTIVE    Modalities Rationale:     decrease pain to improve patient's ability to progress to PLOF and address remaining functional goals.     min [] Estim Unattended, type/location:                                      []  w/ice    []  w/heat    min [] Estim Attended, type/location:                                     []  w/US     []  w/ice    []  w/heat    []  TENS insruct     10 min []  Mechanical Traction: type/lbs 22# max, 11# minimum                  []  pro   []  sup   []  int   []  cont    []  before manual    []  after manual    min []  Ultrasound, settings/location:      min  unbill []  Ice     []  Heat    location/position:     min []  Paraffin,  details:     min []  Vasopneumatic Device, press/temp:     min []  Whirlpool / Fluido:    If using vaso (only need to measure limb vaso being performed on)      pre-treatment girth :       post-treatment girth :       measured at (landmark location) :      min []  Other:    Skin assessment post-treatment:   Intact     Therapeutic Procedures:    Tx Min Billable or 1:1 Min (if diff from Tx Min) Procedure, Rationale, Specifics   10 10 07637 Therapeutic Exercise (timed):  increase ROM, strength, coordination, balance, and proprioception to improve patient's ability to progress to PLOF and address remaining functional goals. (see flow sheet as applicable)     Details if applicable:       10 10 78540 Neuromuscular Re-Education (timed):  improve balance, coordination, kinesthetic

## 2025-02-19 ENCOUNTER — APPOINTMENT (OUTPATIENT)
Facility: HOSPITAL | Age: 45
End: 2025-02-19
Payer: OTHER GOVERNMENT

## 2025-02-26 ENCOUNTER — HOSPITAL ENCOUNTER (OUTPATIENT)
Facility: HOSPITAL | Age: 45
Setting detail: RECURRING SERIES
End: 2025-02-26
Payer: OTHER GOVERNMENT

## (undated) DEVICE — TRAY SUPP STD NO DRUG W EXTENSION SET

## (undated) DEVICE — MIRAGE SWIFT II PILLOW LGE: Brand: MIRAGE SWIFT II

## (undated) DEVICE — SOLUTION IV 250ML 0.9% SOD CHL CLR INJ FLX BG CONT PRT CLSR

## (undated) DEVICE — DRAPE TWL SURG 16X26IN BLU ORB04] ALLCARE INC]

## (undated) DEVICE — (D)BNDG ADHESIVE FABRIC 3/4X3 -- DISC BY MFR USE ITEM 357960

## (undated) DEVICE — CUFF BLD PRESSURE MONITORING LNG AD 23-33 CM 1 TUBE MY CUF

## (undated) DEVICE — SYR 10ML CTRL LR LCK NSAF LF --

## (undated) DEVICE — SET EXTN SM 0.5ML L13IN BOR W/O INJ SITE